# Patient Record
Sex: FEMALE | Race: WHITE | NOT HISPANIC OR LATINO | Employment: STUDENT | URBAN - METROPOLITAN AREA
[De-identification: names, ages, dates, MRNs, and addresses within clinical notes are randomized per-mention and may not be internally consistent; named-entity substitution may affect disease eponyms.]

---
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
## 2020-07-07 DIAGNOSIS — F33.9 RECURRENT MAJOR DEPRESSION (H): ICD-10-CM

## 2020-07-07 DIAGNOSIS — R53.83 FATIGUE: Primary | ICD-10-CM

## 2020-07-15 DIAGNOSIS — R53.83 FATIGUE: ICD-10-CM

## 2020-07-15 DIAGNOSIS — F33.9 RECURRENT MAJOR DEPRESSION (H): ICD-10-CM

## 2020-07-15 LAB
BASOPHILS # BLD AUTO: 0 10E9/L (ref 0–0.2)
BASOPHILS NFR BLD AUTO: 0.4 %
DIFFERENTIAL METHOD BLD: NORMAL
EOSINOPHIL # BLD AUTO: 0.1 10E9/L (ref 0–0.7)
EOSINOPHIL NFR BLD AUTO: 2.2 %
ERYTHROCYTE [DISTWIDTH] IN BLOOD BY AUTOMATED COUNT: 12 % (ref 10–15)
HCT VFR BLD AUTO: 38.1 % (ref 35–47)
HGB BLD-MCNC: 13 G/DL (ref 11.7–15.7)
LYMPHOCYTES # BLD AUTO: 2 10E9/L (ref 0.8–5.3)
LYMPHOCYTES NFR BLD AUTO: 41.1 %
MCH RBC QN AUTO: 31.6 PG (ref 26.5–33)
MCHC RBC AUTO-ENTMCNC: 34.1 G/DL (ref 31.5–36.5)
MCV RBC AUTO: 93 FL (ref 78–100)
MONOCYTES # BLD AUTO: 0.3 10E9/L (ref 0–1.3)
MONOCYTES NFR BLD AUTO: 5.6 %
NEUTROPHILS # BLD AUTO: 2.5 10E9/L (ref 1.6–8.3)
NEUTROPHILS NFR BLD AUTO: 50.7 %
PLATELET # BLD AUTO: 210 10E9/L (ref 150–450)
RBC # BLD AUTO: 4.12 10E12/L (ref 3.8–5.2)
WBC # BLD AUTO: 5 10E9/L (ref 4–11)

## 2020-07-15 PROCEDURE — 36415 COLL VENOUS BLD VENIPUNCTURE: CPT | Performed by: INTERNAL MEDICINE

## 2020-07-15 PROCEDURE — 80050 GENERAL HEALTH PANEL: CPT | Performed by: INTERNAL MEDICINE

## 2020-07-15 PROCEDURE — 82306 VITAMIN D 25 HYDROXY: CPT | Performed by: INTERNAL MEDICINE

## 2020-07-16 LAB — DEPRECATED CALCIDIOL+CALCIFEROL SERPL-MC: 37 UG/L (ref 20–75)

## 2020-07-17 LAB
ALBUMIN SERPL-MCNC: 3.6 G/DL (ref 3.4–5)
ALP SERPL-CCNC: 56 U/L (ref 40–150)
ALT SERPL W P-5'-P-CCNC: 17 U/L (ref 0–50)
ANION GAP SERPL CALCULATED.3IONS-SCNC: 7 MMOL/L (ref 3–14)
AST SERPL W P-5'-P-CCNC: 12 U/L (ref 0–45)
BILIRUB SERPL-MCNC: 0.2 MG/DL (ref 0.2–1.3)
BUN SERPL-MCNC: 6 MG/DL (ref 7–30)
CALCIUM SERPL-MCNC: 8.4 MG/DL (ref 8.5–10.1)
CHLORIDE SERPL-SCNC: 109 MMOL/L (ref 94–109)
CO2 SERPL-SCNC: 23 MMOL/L (ref 20–32)
CREAT SERPL-MCNC: 0.77 MG/DL (ref 0.52–1.04)
GFR SERPL CREATININE-BSD FRML MDRD: >90 ML/MIN/{1.73_M2}
GLUCOSE SERPL-MCNC: 113 MG/DL (ref 70–99)
POTASSIUM SERPL-SCNC: 3.8 MMOL/L (ref 3.4–5.3)
PROT SERPL-MCNC: 7 G/DL (ref 6.8–8.8)
SODIUM SERPL-SCNC: 139 MMOL/L (ref 133–144)
TSH SERPL DL<=0.005 MIU/L-ACNC: 1.14 MU/L (ref 0.4–4)

## 2021-04-22 ENCOUNTER — MEDICAL CORRESPONDENCE (OUTPATIENT)
Dept: HEALTH INFORMATION MANAGEMENT | Facility: CLINIC | Age: 21
End: 2021-04-22

## 2021-04-22 ENCOUNTER — HOSPITAL ENCOUNTER (OUTPATIENT)
Dept: CARDIOLOGY | Facility: CLINIC | Age: 21
End: 2021-04-22
Attending: PEDIATRICS
Payer: COMMERCIAL

## 2021-04-22 ENCOUNTER — HOSPITAL ENCOUNTER (OUTPATIENT)
Dept: GENERAL RADIOLOGY | Facility: CLINIC | Age: 21
End: 2021-04-22
Attending: PEDIATRICS
Payer: COMMERCIAL

## 2021-04-22 DIAGNOSIS — R00.0 TACHYCARDIA: ICD-10-CM

## 2021-04-22 DIAGNOSIS — R00.0 TACHYCARDIA: Primary | ICD-10-CM

## 2021-04-22 LAB — INTERPRETATION ECG - MUSE: NORMAL

## 2021-04-22 PROCEDURE — 93005 ELECTROCARDIOGRAM TRACING: CPT

## 2021-04-22 PROCEDURE — 93227 XTRNL ECG REC<48 HR R&I: CPT | Performed by: INTERNAL MEDICINE

## 2021-04-22 PROCEDURE — 71046 X-RAY EXAM CHEST 2 VIEWS: CPT

## 2021-04-22 PROCEDURE — 93225 XTRNL ECG REC<48 HRS REC: CPT

## 2021-05-02 ENCOUNTER — VIRTUAL VISIT (OUTPATIENT)
Dept: URGENT CARE | Facility: CLINIC | Age: 21
End: 2021-05-02
Payer: COMMERCIAL

## 2021-05-02 DIAGNOSIS — J01.10 ACUTE NON-RECURRENT FRONTAL SINUSITIS: Primary | ICD-10-CM

## 2021-05-02 DIAGNOSIS — R05.9 COUGH: ICD-10-CM

## 2021-05-02 PROCEDURE — 99203 OFFICE O/P NEW LOW 30 MIN: CPT | Mod: TEL | Performed by: NURSE PRACTITIONER

## 2021-05-02 RX ORDER — BENZONATATE 200 MG/1
200 CAPSULE ORAL 3 TIMES DAILY PRN
Qty: 21 CAPSULE | Refills: 0 | Status: SHIPPED | OUTPATIENT
Start: 2021-05-02 | End: 2021-05-09

## 2021-05-02 NOTE — PROGRESS NOTES
SUBJECTIVE:   Sara Ruiz is a 20 year old female presenting with a chief complaint of cold symptoms.  Onset of symptoms was 8 days ago.  Course of illness is worsening.    Severity moderate  Current and Associated symptoms: cough (more at night) nasal congestion, pressure in sinus, headache, sore throat from cough and PND, fatigue  No fever sob wheezing. Hydrated  Treatment measures tried include sudafed mucinex Fluids and Rest.  Predisposing factors include: dad ill. Covid negative.     No past medical history on file.  No current outpatient medications on file.     Social History     Tobacco Use     Smoking status: Not on file   Substance Use Topics     Alcohol use: Not on file       ROS:  Review of systems negative except as stated above.    OBJECTIVE:  GENERAL APPEARANCE:  alert and no distress  RESP: lungs clear   CV: regular rates and rhythm  SKIN: no suspicious lesions or rashes    ASSESSMENT:  (J01.10) Acute non-recurrent frontal sinusitis  (primary encounter diagnosis)    Plan: amoxicillin-clavulanate (AUGMENTIN) 875-125 MG tablet BID X 7 days  Tessalon prn for cough.   Fluids home treat and monitor symptoms    Telephone time spent 15 minutes    OLESYA Zarco CNP

## 2021-05-11 ENCOUNTER — VIRTUAL VISIT (OUTPATIENT)
Dept: URGENT CARE | Facility: CLINIC | Age: 21
End: 2021-05-11
Payer: COMMERCIAL

## 2021-05-11 DIAGNOSIS — R21 RASH: Primary | ICD-10-CM

## 2021-05-11 PROCEDURE — 99213 OFFICE O/P EST LOW 20 MIN: CPT | Mod: GT | Performed by: EMERGENCY MEDICINE

## 2021-05-11 RX ORDER — CLOBETASOL PROPIONATE 0.5 MG/G
CREAM TOPICAL 2 TIMES DAILY
Qty: 15 G | Refills: 1 | Status: SHIPPED | OUTPATIENT
Start: 2021-05-11 | End: 2022-10-27

## 2021-05-11 NOTE — PROGRESS NOTES
Video visit:    Start time 2:20 PM  Stop time 2:35 PM    Assessment: Localized rash on feet and ankles appears to be contact in nature.  No other body areas involvement or systemic allergic symptoms.    Plan: Wash new symptoms carefully and wash feet carefully.  Use clobetasol topically twice daily.  Take Zyrtec daily while rash persist.  Recheck if worsening rash or no improvement in 3 to 4 days.    HPI: Patient is a 20-year-old female who noted the onset of a rash on her feet and ankles today.  She did buy new sandals last week but has been wearing them.  She does state she was walking in the grass with the sandals yesterday.  The rash started today.  It is pruritic.  It is on her foot and extends up to her ankles.  No other areas of body involved.  No systemic allergic symptoms.  No other detergents or new medications.      ROS: See HPI otherwise normal.    Not on File   Current Outpatient Medications   Medication Sig Dispense Refill     clobetasol (TEMOVATE) 0.05 % external cream Apply topically 2 times daily 15 g 1         PE: No acute distress.  Alert.  On video, I can see the areas that appear to be macular papular and somewhat urticarial in location.  They are sparse in bilateral on her feet and lower ankles region.        TREATMENT: None.      ASSESSMENT: Probable contact dermatitis possibly related to chemical or grass.      DIAGNOSIS: Dermatitis.      PLAN: Washed hands and feet of possible chemical contaminant.  Use clobetasol twice daily and take Zyrtec daily while rash persist.  Recheck if worse.  Recheck 3 to 4 days if rash persists.    Time: 15 minutes

## 2021-05-29 ENCOUNTER — HEALTH MAINTENANCE LETTER (OUTPATIENT)
Age: 21
End: 2021-05-29

## 2021-09-18 ENCOUNTER — HEALTH MAINTENANCE LETTER (OUTPATIENT)
Age: 21
End: 2021-09-18

## 2021-10-18 ENCOUNTER — E-VISIT (OUTPATIENT)
Dept: FAMILY MEDICINE | Facility: CLINIC | Age: 21
End: 2021-10-18
Payer: COMMERCIAL

## 2021-10-18 DIAGNOSIS — B35.9 TINEA: Primary | ICD-10-CM

## 2021-10-18 PROCEDURE — 99421 OL DIG E/M SVC 5-10 MIN: CPT | Performed by: PHYSICIAN ASSISTANT

## 2021-10-18 RX ORDER — CLOTRIMAZOLE 1 %
CREAM (GRAM) TOPICAL 2 TIMES DAILY
Qty: 60 G | Refills: 1 | Status: SHIPPED | OUTPATIENT
Start: 2021-10-18 | End: 2022-10-27

## 2021-12-17 ENCOUNTER — TELEPHONE (OUTPATIENT)
Dept: NUTRITION | Facility: CLINIC | Age: 21
End: 2021-12-17
Payer: COMMERCIAL

## 2021-12-17 NOTE — PROGRESS NOTES
Patient was scheduled for a virtual outpatient visit with this writer today at 1100. Patient did not show up on the video call.    Nila Jones RDN, LD  Clinical Dietitian  Office (Monday-Friday): [phone]

## 2022-01-07 ENCOUNTER — VIRTUAL VISIT (OUTPATIENT)
Dept: PEDIATRICS | Facility: CLINIC | Age: 22
End: 2022-01-07
Payer: COMMERCIAL

## 2022-01-07 DIAGNOSIS — R09.81 NASAL CONGESTION: ICD-10-CM

## 2022-01-07 DIAGNOSIS — J32.9 VIRAL SINUSITIS: Primary | ICD-10-CM

## 2022-01-07 DIAGNOSIS — U07.1 INFECTION DUE TO 2019 NOVEL CORONAVIRUS: ICD-10-CM

## 2022-01-07 DIAGNOSIS — B97.89 VIRAL SINUSITIS: Primary | ICD-10-CM

## 2022-01-07 PROCEDURE — 99203 OFFICE O/P NEW LOW 30 MIN: CPT | Mod: 95 | Performed by: PEDIATRICS

## 2022-01-07 RX ORDER — LISDEXAMFETAMINE DIMESYLATE 70 MG/1
CAPSULE ORAL
COMMUNITY
Start: 2021-12-24 | End: 2023-09-22

## 2022-01-07 RX ORDER — CALCIUM CARBONATE 500(1250)
TABLET ORAL
COMMUNITY
End: 2024-02-23

## 2022-01-07 RX ORDER — SERTRALINE HYDROCHLORIDE 25 MG/1
TABLET, FILM COATED ORAL
COMMUNITY
End: 2022-12-20

## 2022-01-07 RX ORDER — MULTIVITAMIN WITH IRON
TABLET ORAL
COMMUNITY

## 2022-01-07 RX ORDER — CEFDINIR 300 MG/1
300 CAPSULE ORAL 2 TIMES DAILY
Qty: 20 CAPSULE | Refills: 0 | Status: SHIPPED | OUTPATIENT
Start: 2022-01-07 | End: 2022-01-17

## 2022-01-07 RX ORDER — DROSPIRENONE AND ETHINYL ESTRADIOL 0.02-3(28)
1 KIT ORAL
COMMUNITY
End: 2022-10-27

## 2022-01-07 NOTE — PROGRESS NOTES
Chyna is a 21 year old who is being evaluated via a billable video visit.      How would you like to obtain your AVS? MyChart  If the video visit is dropped, the invitation should be resent by: [phone]  Will anyone else be joining your video visit? No      Video Start Time: 304p    Assessment & Plan     Viral sinusitis  Infection due to 2019 novel coronavirus  Nasal congestion  Well appearing  Likely viral sinusitis given small duration and lack of fevers, likely to resolve without issue  No other concerning symptoms/signs with COVID infection currently  Reviewed when to initiate treatment for sinusitis which would be >10 days or if worsening, rx sent to where she attends college to fill if needed.   Reviewed other otc tylenol/ibuprofen and sudafed  RTC precautions discussed.   - cefdinir (OMNICEF) 300 MG capsule; Take 1 capsule (300 mg) by mouth 2 times daily for 10 days Start using if no improvement by 10 days from symptom onset    Prescription drug management             Return in about 6 months (around 7/7/2022) for Routine preventive.    Bharat Stewart MD  [facility]   Chyna is a 21 year old who presents for the following health issues     HPI   Tested positive for covid a couple days ago.  Symptoms characteristic of congestion, sinus pressure, yellow green rhinorrhea, pain in upper jaw. Headache. Sore throat and cough. Seems to be a little better but she is worried that with covid she may need antibiotics for sinus infection.   She had a sinus infection last year and seems similar  She is using otc products like sudafed.       History of anxiety/depression/adhd on medications      Review of Systems   See HPI for pertinent positives/negatives. All other systems reviewed and are negative        Objective           Vitals:  No vitals were obtained today due to virtual visit.    Physical Exam   GENERAL: Healthy, alert and no distress  EYES: Eyes grossly normal to  inspection.  No discharge or erythema, or obvious scleral/conjunctival abnormalities.  RESP: No audible wheeze, cough, or visible cyanosis.  No visible retractions or increased work of breathing.    SKIN: Visible skin clear. No significant rash, abnormal pigmentation or lesions.  NEURO: Cranial nerves grossly intact.  Mentation and speech appropriate for age.  PSYCH: Mentation appears normal, affect normal/bright, judgement and insight intact, normal speech and appearance well-groomed.                Video-Visit Details    Type of service:  Video Visit    Video End Time: 314p      Originating Location (pt. Location): Home    Distant Location (provider location):  [facility]     Platform used for Video Visit: Cerephex

## 2022-03-14 ENCOUNTER — TRANSFERRED RECORDS (OUTPATIENT)
Dept: MULTI SPECIALTY CLINIC | Facility: CLINIC | Age: 22
End: 2022-03-14

## 2022-03-14 LAB
CHLAMYDIA - HIM PATIENT REPORTED: NEGATIVE
PAP SMEAR - HIM PATIENT REPORTED: NEGATIVE

## 2022-06-25 ENCOUNTER — HEALTH MAINTENANCE LETTER (OUTPATIENT)
Age: 22
End: 2022-06-25

## 2022-10-27 ENCOUNTER — OFFICE VISIT (OUTPATIENT)
Dept: FAMILY MEDICINE | Facility: CLINIC | Age: 22
End: 2022-10-27
Payer: COMMERCIAL

## 2022-10-27 VITALS
BODY MASS INDEX: 20.51 KG/M2 | HEIGHT: 67 IN | WEIGHT: 130.7 LBS | OXYGEN SATURATION: 100 % | RESPIRATION RATE: 16 BRPM | TEMPERATURE: 99.1 F | DIASTOLIC BLOOD PRESSURE: 58 MMHG | SYSTOLIC BLOOD PRESSURE: 96 MMHG | HEART RATE: 120 BPM

## 2022-10-27 DIAGNOSIS — R04.0 EPISTAXIS: ICD-10-CM

## 2022-10-27 DIAGNOSIS — Z01.818 PREOP GENERAL PHYSICAL EXAM: Primary | ICD-10-CM

## 2022-10-27 DIAGNOSIS — F32.1 MODERATE MAJOR DEPRESSION (H): ICD-10-CM

## 2022-10-27 PROBLEM — F98.8 ATTENTION DEFICIT DISORDER OF ADULT: Status: ACTIVE | Noted: 2022-10-27

## 2022-10-27 LAB
ANION GAP SERPL CALCULATED.3IONS-SCNC: 6 MMOL/L (ref 3–14)
BASOPHILS # BLD AUTO: 0 10E3/UL (ref 0–0.2)
BASOPHILS NFR BLD AUTO: 1 %
BUN SERPL-MCNC: 11 MG/DL (ref 7–30)
CALCIUM SERPL-MCNC: 9.4 MG/DL (ref 8.5–10.1)
CHLORIDE BLD-SCNC: 109 MMOL/L (ref 94–109)
CO2 SERPL-SCNC: 26 MMOL/L (ref 20–32)
CREAT SERPL-MCNC: 0.73 MG/DL (ref 0.52–1.04)
DEPRECATED CALCIDIOL+CALCIFEROL SERPL-MC: 96 UG/L (ref 20–75)
EOSINOPHIL # BLD AUTO: 0.1 10E3/UL (ref 0–0.7)
EOSINOPHIL NFR BLD AUTO: 3 %
ERYTHROCYTE [DISTWIDTH] IN BLOOD BY AUTOMATED COUNT: 11.7 % (ref 10–15)
GFR SERPL CREATININE-BSD FRML MDRD: >90 ML/MIN/1.73M2
GLUCOSE BLD-MCNC: 88 MG/DL (ref 70–99)
HCT VFR BLD AUTO: 40.7 % (ref 35–47)
HGB BLD-MCNC: 13.7 G/DL (ref 11.7–15.7)
LYMPHOCYTES # BLD AUTO: 1.9 10E3/UL (ref 0.8–5.3)
LYMPHOCYTES NFR BLD AUTO: 48 %
MCH RBC QN AUTO: 30.1 PG (ref 26.5–33)
MCHC RBC AUTO-ENTMCNC: 33.7 G/DL (ref 31.5–36.5)
MCV RBC AUTO: 90 FL (ref 78–100)
MONOCYTES # BLD AUTO: 0.3 10E3/UL (ref 0–1.3)
MONOCYTES NFR BLD AUTO: 7 %
NEUTROPHILS # BLD AUTO: 1.7 10E3/UL (ref 1.6–8.3)
NEUTROPHILS NFR BLD AUTO: 43 %
PLATELET # BLD AUTO: 178 10E3/UL (ref 150–450)
POTASSIUM BLD-SCNC: 3.7 MMOL/L (ref 3.4–5.3)
RBC # BLD AUTO: 4.55 10E6/UL (ref 3.8–5.2)
SODIUM SERPL-SCNC: 141 MMOL/L (ref 133–144)
TSH SERPL DL<=0.005 MIU/L-ACNC: 2.08 MU/L (ref 0.4–4)
WBC # BLD AUTO: 4 10E3/UL (ref 4–11)

## 2022-10-27 PROCEDURE — 85025 COMPLETE CBC W/AUTO DIFF WBC: CPT | Performed by: PHYSICIAN ASSISTANT

## 2022-10-27 PROCEDURE — 82306 VITAMIN D 25 HYDROXY: CPT | Performed by: PHYSICIAN ASSISTANT

## 2022-10-27 PROCEDURE — 84443 ASSAY THYROID STIM HORMONE: CPT | Performed by: PHYSICIAN ASSISTANT

## 2022-10-27 PROCEDURE — 96127 BRIEF EMOTIONAL/BEHAV ASSMT: CPT | Performed by: PHYSICIAN ASSISTANT

## 2022-10-27 PROCEDURE — 99213 OFFICE O/P EST LOW 20 MIN: CPT | Performed by: PHYSICIAN ASSISTANT

## 2022-10-27 PROCEDURE — 80048 BASIC METABOLIC PNL TOTAL CA: CPT | Performed by: PHYSICIAN ASSISTANT

## 2022-10-27 PROCEDURE — 36415 COLL VENOUS BLD VENIPUNCTURE: CPT | Performed by: PHYSICIAN ASSISTANT

## 2022-10-27 RX ORDER — BUSPIRONE HYDROCHLORIDE 10 MG/1
20 TABLET ORAL 2 TIMES DAILY
COMMUNITY
End: 2023-09-22

## 2022-10-27 RX ORDER — MIRTAZAPINE 15 MG/1
15 TABLET, FILM COATED ORAL AT BEDTIME
COMMUNITY
End: 2023-01-24

## 2022-10-27 ASSESSMENT — PATIENT HEALTH QUESTIONNAIRE - PHQ9
10. IF YOU CHECKED OFF ANY PROBLEMS, HOW DIFFICULT HAVE THESE PROBLEMS MADE IT FOR YOU TO DO YOUR WORK, TAKE CARE OF THINGS AT HOME, OR GET ALONG WITH OTHER PEOPLE: VERY DIFFICULT
SUM OF ALL RESPONSES TO PHQ QUESTIONS 1-9: 16
SUM OF ALL RESPONSES TO PHQ QUESTIONS 1-9: 16

## 2022-10-27 ASSESSMENT — PAIN SCALES - GENERAL: PAINLEVEL: NO PAIN (0)

## 2022-10-27 NOTE — PATIENT INSTRUCTIONS
Preparing for Your Surgery  Getting started  A nurse will call you to review your health history and instructions. They will give you an arrival time based on your scheduled surgery time. Please be ready to share:    Your doctor's clinic name and phone number    Your medical, surgical and anesthesia history    A list of allergies and sensitivities    A list of medicines, including herbal treatments and over-the-counter drugs    Whether the patient has a legal guardian (ask how to send us the papers in advance)  Please tell us if you're pregnant--or if there's any chance you might be pregnant. Some surgeries may injure a fetus (unborn baby), so they require a pregnancy test. Surgeries that are safe for a fetus don't always need a test, and you can choose whether to have one.   If you have a child who's having surgery, please ask for a copy of Preparing for Your Child's Surgery.    Preparing for surgery    Within 10 to 30 days of surgery: Have a pre-op exam (sometimes called an H&P, or History and Physical). This can be done at a clinic or pre-operative center.  ? If you're having a , you may not need this exam. Talk to your care team.    At your pre-op exam, talk to your care team about all medicines you take. If you need to stop any medicines before surgery, ask when to start taking them again.  ? We do this for your safety. Many medicines can make you bleed too much during surgery. Some change how well surgery (anesthesia) drugs work.    Call your insurance company to let them know you're having surgery. (If you don't have insurance, call [phone].)    Call your clinic if there's any change in your health. This includes signs of a cold or flu (sore throat, runny nose, cough, rash, fever). It also includes a scrape or scratch near the surgery site.    If you have questions on the day of surgery, call your hospital or surgery center.  COVID testing  You may need to be tested for COVID-19 before having  surgery. If so, we will give you instructions (or click here).  Eating and drinking guidelines  For your safety: Unless your surgeon tells you otherwise, follow the guidelines below.    Eat and drink as usual until 8 hours before surgery. After that, no food or milk.    Drink clear liquids until 2 hours before surgery. These are liquids you can see through, like water, Gatorade and Propel Water. You may also have black coffee and tea (no cream or milk).    Nothing by mouth within 2 hours of surgery. This includes gum, candy and breath mints.    If you drink alcohol: Stop drinking it the night before surgery.    If your care team tells you to take medicine on the morning of surgery, it's okay to take it with a sip of water.  Preventing infection    Shower or bathe the night before and morning of your surgery. Follow the instructions your clinic gave you. (If no instructions, use regular soap.)    Don't shave or clip hair near your surgery site. We'll remove the hair if needed.    Don't smoke or vape the morning of surgery. You may chew nicotine gum up to 2 hours before surgery. A nicotine patch is okay.  ? Note: Some surgeries require you to completely quit smoking and nicotine. Check with your surgeon.    Your care team will make every effort to keep you safe from infection. We will:  ? Clean our hands often with soap and water (or an alcohol-based hand rub).  ? Clean the skin at your surgery site with a special soap that kills germs.  ? Give you a special gown to keep you warm. (Cold raises the risk of infection.)  ? Wear special hair covers, masks, gowns and gloves during surgery.  ? Give antibiotic medicine, if prescribed. Not all surgeries need antibiotics.  What to bring on the day of surgery    Photo ID and insurance card    Copy of your health care directive, if you have one    Glasses and hearing aides (bring cases)  ? You can't wear contacts during surgery    Inhaler and eye drops, if you use them (tell us  about these when you arrive)    CPAP machine or breathing device, if you use them    A few personal items, if spending the night    If you have . . .  ? A pacemaker, ICD (cardiac defibrillator) or other implant: Bring the ID card.  ? An implanted stimulator: Bring the remote control.  ? A legal guardian: Bring a copy of the certified (court-stamped) guardianship papers.  Please remove any jewelry, including body piercings. Leave jewelry and other valuables at home.  If you're going home the day of surgery    You must have a responsible adult drive you home. They should stay with you overnight as well.    If you don't have someone to stay with you, and you aren't safe to go home alone, we may keep you overnight. Insurance often won't pay for this.  After surgery  If it's hard to control your pain or you need more pain medicine, please call your surgeon's office.  Questions?   If you have any questions for your care team, list them here: _________________________________________________________________________________________________________________________________________________________________________ ____________________________________ ____________________________________ ____________________________________  For informational purposes only. Not to replace the advice of your health care provider. Copyright   2003, 2019 [facility]. All rights reserved. Clinically reviewed by Karol Crystal MD. [employer] [id number] - REV 07/22.

## 2022-10-27 NOTE — PROGRESS NOTES
[facility]  [address] [address] [address]  Phone: [phone]  Primary Provider: Sreekanth Garduno  Pre-op Performing Provider: CECY RAMAN      PREOPERATIVE EVALUATION:  Today's date: 10/27/2022    Sara Ruiz is a 22 year old female who presents for a preoperative evaluation.    Surgical Information:  Surgery/Procedure: electrocautery for nose  Surgery Location: [facility]  Surgeon: Dr Jenkins  Surgery Date: 11/23/2022  Time of Surgery: TBD  Where patient plans to recover: At home with family  Fax number for surgical facility: [phone]    Type of Anesthesia Anticipated: General    Assessment & Plan     The proposed surgical procedure is considered LOW risk.    Preop general physical exam  Ok for procedure  - CBC with platelets and differential; Future  - Basic metabolic panel  (Ca, Cl, CO2, Creat, Gluc, K, Na, BUN); Future  - CBC with platelets and differential  - Basic metabolic panel  (Ca, Cl, CO2, Creat, Gluc, K, Na, BUN)    Epistaxis  Following with ENT  - CBC with platelets and differential; Future  - CBC with platelets and differential    Moderate major depression (H)  Patient had lab request from Psych provider.  Has follow up physical with me soon.  - TSH with free T4 reflex; Future  - Vitamin D Deficiency; Future  - TSH with free T4 reflex  - Vitamin D Deficiency    Please abstract the following data from this visit with this patient into the appropriate field in Epic:    Tests that can be patient reported without a hard copy:    Pap smear done on this date: 3/14/22 (approximately), by this group: [facility], results were normal and Chlamydia testing done on this date: 3/14/22           Risks and Recommendations:  The patient has the following additional risks and recommendations for perioperative complications:   - No identified additional risk factors other than previously addressed    Medication Instructions:  Patient is to  take all scheduled medications on the day of surgery    RECOMMENDATION:  APPROVAL GIVEN to proceed with proposed procedure, without further diagnostic evaluation.      Subjective     HPI related to upcoming procedure: patient here for pre op exam prior to surgery for recurrent nose bleeds.  No current concerns.    Preop Questions 10/27/2022   1. Have you ever had a heart attack or stroke? No   2. Have you ever had surgery on your heart or blood vessels, such as a stent placement, a coronary artery bypass, or surgery on an artery in your head, neck, heart, or legs? No   3. Do you have chest pain with activity? No   4. Do you have a history of  heart failure? No   5. Do you currently have a cold, bronchitis or symptoms of other infection? No   6. Do you have a cough, shortness of breath, or wheezing? No   7. Do you or anyone in your family have previous history of blood clots? YES - mom had PE in pregnancy, had antiphospholipid syn   8. Do you or does anyone in your family have a serious bleeding problem such as prolonged bleeding following surgeries or cuts? No   9. Have you ever had problems with anemia or been told to take iron pills? YES    10. Have you had any abnormal blood loss such as black, tarry or bloody stools, or abnormal vaginal bleeding? UNKNOWN    11. Have you ever had a blood transfusion? No   12. Are you willing to have a blood transfusion if it is medically needed before, during, or after your surgery? Yes   13. Have you or any of your relatives ever had problems with anesthesia? No   14. Do you have sleep apnea, excessive snoring or daytime drowsiness? No   15. Do you have any artifical heart valves or other implanted medical devices like a pacemaker, defibrillator, or continuous glucose monitor? No   16. Do you have artificial joints? No   17. Are you allergic to latex? No   18. Is there any chance that you may be pregnant? No         Health Care Directive:  Patient does not have a Health Care  Directive or Living Will: Patient states has Advance Directive and will bring in a copy to clinic.    Preoperative Review of :   reviewed - controlled substances reflected in medication list.      Status of Chronic Conditions:  See problem list for active medical problems.  Problems all longstanding and stable, except as noted/documented.  See ROS for pertinent symptoms related to these conditions.      Review of Systems  Constitutional, neuro, ENT, endocrine, pulmonary, cardiac, gastrointestinal, genitourinary, musculoskeletal, integument and psychiatric systems are negative, except as otherwise noted.    There are no problems to display for this patient.     No past medical history on file.  No past surgical history on file.  Current Outpatient Medications   Medication Sig Dispense Refill     calcium carbonate (OS-JOSÉ) 500 MG tablet        cholecalciferol 50 MCG (2000 UT) CAPS Take 2,000 Units by mouth       clobetasol (TEMOVATE) 0.05 % external cream Apply topically 2 times daily 15 g 1     clotrimazole (LOTRIMIN) 1 % external cream Apply topically 2 times daily (Patient not taking: Reported on 1/7/2022) 60 g 1     drospirenone-ethinyl estradiol (PATRICK) 3-0.02 MG tablet Take 1 tablet by mouth       magnesium 250 MG tablet        sertraline (ZOLOFT) 25 MG tablet        VYVANSE 70 MG capsule          No Known Allergies     Social History     Tobacco Use     Smoking status: Not on file     Smokeless tobacco: Not on file   Substance Use Topics     Alcohol use: Not on file        History   Drug Use Not on file     Answers for HPI/ROS submitted by the patient on 10/27/2022  If you checked off any problems, how difficult have these problems made it for you to do your work, take care of things at home, or get along with other people?: Very difficult  PHQ9 TOTAL SCORE: 16      Objective     There were no vitals taken for this visit.    Physical Exam    GENERAL APPEARANCE: healthy, alert and no distress     EYES: EOMI,  PERRL     HENT: ear canals and TM's normal and nose and mouth without ulcers or lesions     NECK: no adenopathy, no asymmetry, masses, or scars and thyroid normal to palpation     RESP: lungs clear to auscultation - no rales, rhonchi or wheezes     CV: regular rates and rhythm, normal S1 S2, no S3 or S4 and no murmur, click or rub     ABDOMEN:  soft, nontender, no HSM or masses and bowel sounds normal     MS: extremities normal- no gross deformities noted, no evidence of inflammation in joints, FROM in all extremities.     SKIN: no suspicious lesions or rashes     NEURO: Normal strength and tone, sensory exam grossly normal, mentation intact and speech normal     PSYCH: mentation appears normal. and affect normal/bright     LYMPHATICS: No cervical adenopathy    No results for input(s): HGB, PLT, INR, NA, POTASSIUM, CR, A1C in the last [id number] hours.     Diagnostics:  Labs pending at this time.  Results will be reviewed when available.   No EKG required, no history of coronary heart disease, significant arrhythmia, peripheral arterial disease or other structural heart disease.    Revised Cardiac Risk Index (RCRI):  The patient has the following serious cardiovascular risks for perioperative complications:   - No serious cardiac risks = 0 points     RCRI Interpretation: 0 points: Class I (very low risk - 0.4% complication rate)           Signed Electronically by: Bhupinder Simmons PA-C  Copy of this evaluation report is provided to requesting physician.

## 2022-11-19 ENCOUNTER — HEALTH MAINTENANCE LETTER (OUTPATIENT)
Age: 22
End: 2022-11-19

## 2022-11-25 ENCOUNTER — OFFICE VISIT (OUTPATIENT)
Dept: URGENT CARE | Facility: URGENT CARE | Age: 22
End: 2022-11-25
Payer: COMMERCIAL

## 2022-11-25 VITALS
RESPIRATION RATE: 14 BRPM | DIASTOLIC BLOOD PRESSURE: 72 MMHG | OXYGEN SATURATION: 98 % | BODY MASS INDEX: 20.36 KG/M2 | TEMPERATURE: 98.9 F | WEIGHT: 130 LBS | SYSTOLIC BLOOD PRESSURE: 118 MMHG | HEART RATE: 89 BPM

## 2022-11-25 DIAGNOSIS — Z20.828 EXPOSURE TO INFLUENZA: Primary | ICD-10-CM

## 2022-11-25 LAB
FLUAV AG SPEC QL IA: NEGATIVE
FLUBV AG SPEC QL IA: NEGATIVE

## 2022-11-25 PROCEDURE — 99213 OFFICE O/P EST LOW 20 MIN: CPT | Performed by: PHYSICIAN ASSISTANT

## 2022-11-25 PROCEDURE — 87804 INFLUENZA ASSAY W/OPTIC: CPT | Performed by: PHYSICIAN ASSISTANT

## 2022-11-25 RX ORDER — OSELTAMIVIR PHOSPHATE 75 MG/1
75 CAPSULE ORAL 2 TIMES DAILY
Qty: 10 CAPSULE | Refills: 0 | Status: SHIPPED | OUTPATIENT
Start: 2022-11-25 | End: 2022-11-30

## 2022-11-26 NOTE — PROGRESS NOTES
Assessment & Plan     1. Exposure to influenza  Patient has had exposure to influenza 2 days ago, currently asymptomatic.  Influenza test is negative.  Discussed with patient both treatment and prophylaxis with Tamiflu.  At this point she is not at high risk of complications from influenza, and with side effect profile of Tamiflu (nausea/vomiting), I think it would be better to wait to see if she develops symptoms of influenza.  She was given a paper prescription of Tamiflu, and advised to begin if the development of body aches, fever, chills or dry cough.  - Influenza A/B antigen  - oseltamivir (TAMIFLU) 75 MG capsule; Take 1 capsule (75 mg) by mouth 2 times daily for 5 days  Dispense: 10 capsule; Refill: 0      Kelly Mello PA-C  [facility]    CHIEF COMPLAINT:   Chief Complaint   Patient presents with     Consult     Wants a flu test -- DAD has positive flu and wants to be tested --       Senthil Clemente is a 22 year old female who presents to clinic today for evaluation.  Patient had surgery on 1123, saw her dad in the preop area.  Today dad felt ill, and was diagnosed with influenza.  Patient had nasal cauterization, is unable to blow her nose for 2 weeks.  Concerned that if she develops flu may be measurable.  Currently asymptomatic, no fever, chills, body aches, runny nose or cough.      History reviewed. No pertinent past medical history.  History reviewed. No pertinent surgical history.  Social History     Tobacco Use     Smoking status: Never     Smokeless tobacco: Never   Substance Use Topics     Alcohol use: Not Currently     Current Outpatient Medications   Medication     busPIRone (BUSPAR) 10 MG tablet     calcium carbonate (OS-JOSÉ) 500 MG tablet     cholecalciferol 50 MCG (2000 UT) CAPS     etonogestrel (NEXPLANON) 68 MG IMPL     magnesium 250 MG tablet     mirtazapine (REMERON) 15 MG tablet     oseltamivir (TAMIFLU) 75 MG capsule     sertraline (ZOLOFT) 25 MG tablet      VYVANSE 70 MG capsule     No current facility-administered medications for this visit.     No Known Allergies    10 point ROS of systems were all negative except for pertinent positives noted in my HPI.      Exam:   /72 (BP Location: Right arm, Patient Position: Chair, Cuff Size: Adult Regular)   Pulse 89   Temp 98.9  F (37.2  C) (Oral)   Resp 14   Wt 59 kg (130 lb)   LMP 11/13/2022 (Approximate)   SpO2 98%   BMI 20.36 kg/m    Constitutional: healthy, alert and no distress  Head: Normocephalic, atraumatic.  Skin: no rashes  Neurologic: Speech clear, gait normal. Moves all extremities.    Results for orders placed or performed in visit on 11/25/22   Influenza A/B antigen     Status: Normal    Specimen: Nose; Swab   Result Value Ref Range    Influenza A antigen Negative Negative    Influenza B antigen Negative Negative    Narrative    Test results must be correlated with clinical data. If necessary, results should be confirmed by a molecular assay or viral culture.

## 2022-12-20 ENCOUNTER — OFFICE VISIT (OUTPATIENT)
Dept: FAMILY MEDICINE | Facility: CLINIC | Age: 22
End: 2022-12-20
Payer: COMMERCIAL

## 2022-12-20 VITALS
HEART RATE: 138 BPM | RESPIRATION RATE: 16 BRPM | OXYGEN SATURATION: 97 % | SYSTOLIC BLOOD PRESSURE: 100 MMHG | TEMPERATURE: 97.2 F | DIASTOLIC BLOOD PRESSURE: 62 MMHG | BODY MASS INDEX: 20.67 KG/M2 | HEIGHT: 67 IN | WEIGHT: 131.7 LBS

## 2022-12-20 DIAGNOSIS — R00.0 TACHYCARDIA: ICD-10-CM

## 2022-12-20 DIAGNOSIS — Z00.00 ROUTINE GENERAL MEDICAL EXAMINATION AT A HEALTH CARE FACILITY: Primary | ICD-10-CM

## 2022-12-20 DIAGNOSIS — F32.1 MODERATE MAJOR DEPRESSION (H): ICD-10-CM

## 2022-12-20 DIAGNOSIS — R19.7 DIARRHEA, UNSPECIFIED TYPE: ICD-10-CM

## 2022-12-20 PROCEDURE — 99395 PREV VISIT EST AGE 18-39: CPT | Performed by: PHYSICIAN ASSISTANT

## 2022-12-20 RX ORDER — DICYCLOMINE HCL 20 MG
20 TABLET ORAL 4 TIMES DAILY PRN
Qty: 40 TABLET | Refills: 0 | Status: SHIPPED | OUTPATIENT
Start: 2022-12-20

## 2022-12-20 RX ORDER — VILAZODONE HYDROCHLORIDE 10 MG/1
10 TABLET ORAL DAILY
COMMUNITY
Start: 2022-12-20 | End: 2023-09-22

## 2022-12-20 RX ORDER — NORETHINDRONE ACETATE AND ETHINYL ESTRADIOL AND FERROUS FUMARATE 1MG-20(21)
1 KIT ORAL DAILY
COMMUNITY
Start: 2022-11-22

## 2022-12-20 ASSESSMENT — ENCOUNTER SYMPTOMS
DIZZINESS: 0
JOINT SWELLING: 0
CHILLS: 0
BREAST MASS: 0
FREQUENCY: 0
DIARRHEA: 1
HEADACHES: 1
COUGH: 1
CONSTIPATION: 1
HEARTBURN: 0
SORE THROAT: 0
NERVOUS/ANXIOUS: 1
NAUSEA: 1
HEMATOCHEZIA: 0
MYALGIAS: 0
PARESTHESIAS: 0
ABDOMINAL PAIN: 0
DYSURIA: 0
HEMATURIA: 0
ARTHRALGIAS: 0
EYE PAIN: 1
SHORTNESS OF BREATH: 0
WEAKNESS: 0
PALPITATIONS: 1
FEVER: 0

## 2022-12-20 ASSESSMENT — PAIN SCALES - GENERAL: PAINLEVEL: NO PAIN (0)

## 2022-12-20 ASSESSMENT — PATIENT HEALTH QUESTIONNAIRE - PHQ9
SUM OF ALL RESPONSES TO PHQ QUESTIONS 1-9: 18
10. IF YOU CHECKED OFF ANY PROBLEMS, HOW DIFFICULT HAVE THESE PROBLEMS MADE IT FOR YOU TO DO YOUR WORK, TAKE CARE OF THINGS AT HOME, OR GET ALONG WITH OTHER PEOPLE: VERY DIFFICULT
SUM OF ALL RESPONSES TO PHQ QUESTIONS 1-9: 18

## 2022-12-20 NOTE — PROGRESS NOTES
SUBJECTIVE:   CC: Chyna is an 22 year old who presents for preventive health visit.         Patient has been advised of split billing requirements and indicates understanding: Yes       Healthy Habits:     Getting at least 3 servings of Calcium per day:  Yes    Bi-annual eye exam:  Yes    Dental care twice a year:  Yes    Sleep apnea or symptoms of sleep apnea:  Daytime drowsiness    Diet:  Gluten-free/reduced, Breakfast skipped and Other    Frequency of exercise:  None    Taking medications regularly:  Yes    Medication side effects:  Other    PHQ-2 Total Score: 4    Additional concerns today:  Yes       Does med management through [facility]  [facility] at [facility]- gets birth control from them    Most foods lately causing GI upset and diarrhea.  Going on last couple years but getting worse  Cheese. Lactose?  Gluten/bread bothers stomach  BBQ sauce      Heart rate always high.  Has had cardiology work up and Holter Monitor was done in the past which didn't show anything.    Today's PHQ-2 Score:   PHQ-2 ( 1999 [employer]) 12/20/2022   Q1: Little interest or pleasure in doing things 2   Q2: Feeling down, depressed or hopeless 2   PHQ-2 Score 4   Q1: Little interest or pleasure in doing things More than half the days   Q2: Feeling down, depressed or hopeless More than half the days   PHQ-2 Score 4       Have you ever done Advance Care Planning? (For example, a Health Directive, POLST, or a discussion with a medical provider or your loved ones about your wishes): Yes, patient states has an Advance Care Planning document and will bring a copy to the clinic.    Social History     Tobacco Use     Smoking status: Never     Passive exposure: Never     Smokeless tobacco: Never   Substance Use Topics     Alcohol use: Not Currently         Alcohol Use 12/20/2022   Prescreen: >3 drinks/day or >7 drinks/week? Not Applicable       Reviewed orders with patient.  Reviewed health maintenance and updated orders accordingly - Yes  Lab work is in  "process    Breast Cancer Screening:        History of abnormal Pap smear: NO - age 21-29 PAP every 3 years recommended     Reviewed and updated as needed this visit by clinical staff   Tobacco  Allergies  Meds              Reviewed and updated as needed this visit by Provider                     Review of Systems   Constitutional: Negative for chills and fever.   HENT: Positive for congestion. Negative for ear pain, hearing loss and sore throat.    Eyes: Positive for pain. Negative for visual disturbance.   Respiratory: Positive for cough. Negative for shortness of breath.    Cardiovascular: Positive for palpitations. Negative for chest pain and peripheral edema.   Gastrointestinal: Positive for constipation, diarrhea and nausea. Negative for abdominal pain, heartburn and hematochezia.   Breasts:  Negative for tenderness, breast mass and discharge.   Genitourinary: Positive for vaginal bleeding and vaginal discharge. Negative for dysuria, frequency, genital sores, hematuria, pelvic pain and urgency.   Musculoskeletal: Negative for arthralgias, joint swelling and myalgias.   Skin: Positive for rash.   Neurological: Positive for headaches. Negative for dizziness, weakness and paresthesias.   Psychiatric/Behavioral: Positive for mood changes. The patient is nervous/anxious.            OBJECTIVE:   /62 (BP Location: Right arm, Patient Position: Sitting, Cuff Size: Adult Regular)   Pulse (!) 138   Temp 97.2  F (36.2  C) (Oral)   Resp 16   Ht 1.702 m (5' 7\")   Wt 59.7 kg (131 lb 11.2 oz)   LMP 12/20/2022   SpO2 97%   BMI 20.63 kg/m    Physical Exam  GENERAL: healthy, alert and no distress  EYES: Eyes grossly normal to inspection, PERRL and conjunctivae and sclerae normal  HENT: ear canals and TM's normal, nose and mouth without ulcers or lesions  NECK: no adenopathy, no asymmetry, masses, or scars and thyroid normal to palpation  RESP: lungs clear to auscultation - no rales, rhonchi or wheezes  CV: " tachycardia, normal S1 S2, no S3 or S4, no murmur, click or rub, peripheral pulses strong and no peripheral edema  ABDOMEN: soft, nontender, no hepatosplenomegaly, no masses and bowel sounds normal  MS: no gross musculoskeletal defects noted, no edema  SKIN: no suspicious lesions or rashes  NEURO: Normal strength and tone, mentation intact and speech normal  PSYCH: mentation appears normal, affect normal/bright    Diagnostic Test Results:  Labs reviewed in Epic    ASSESSMENT/PLAN:   (Z00.00) Routine general medical examination at a health care facility  (primary encounter diagnosis)  Comment:   Plan: patient had labs done in October    (F32.1) Moderate major depression (H)  Comment:   Plan: Follows with Psych    PHQ 10/27/2022 12/20/2022   PHQ-9 Total Score 16 18   Q9: Thoughts of better off dead/self-harm past 2 weeks Not at all Not at all       (R00.0) Tachycardia  Comment:   Plan: discussed- if having symptoms let me know.  Can refer again if needed.    (R19.7) Diarrhea, unspecified type  Comment:   Plan: dicyclomine (BENTYL) 20 MG tablet        Discussed trial of FODMAP diet.              COUNSELING:  Reviewed preventive health counseling, as reflected in patient instructions        She reports that she has never smoked. She has never been exposed to tobacco smoke. She has never used smokeless tobacco.      Bhupinder Simmons PA-C  [facility] [place]  Answers for HPI/ROS submitted by the patient on 12/20/2022  If you checked off any problems, how difficult have these problems made it for you to do your work, take care of things at home, or get along with other people?: Very difficult  PHQ9 TOTAL SCORE: 18

## 2022-12-20 NOTE — PATIENT INSTRUCTIONS
Preventive Health Recommendations  Female Ages 21 to 25     Yearly exam:   See your health care provider every year in order to  Review health changes.   Discuss preventive care.    Review your medicines if your doctor has prescribed any.    You should be tested each year for STDs (sexually transmitted diseases).     Talk to your provider about how often you should have cholesterol testing.    Get a Pap test every three years. If you have an abnormal result, your doctor may have you test more often.    If you are at risk for diabetes, you should have a diabetes test (fasting glucose).     Shots:   Get a flu shot each year.   Get a tetanus shot every 10 years.   Consider getting the shot (vaccine) that prevents cervical cancer (Gardasil).    Nutrition:   Eat at least 5 servings of fruits and vegetables each day.  Eat whole-grain bread, whole-wheat pasta and brown rice instead of white grains and rice.  Get adequate Calcium and Vitamin D.     Lifestyle  Exercise at least 150 minutes a week each week (30 minutes a day, 5 days a week). This will help you control your weight and prevent disease.  Limit alcohol to one drink per day.  No smoking.   Wear sunscreen to prevent skin cancer.  See your dentist every six months for an exam and cleaning.

## 2022-12-22 ENCOUNTER — E-VISIT (OUTPATIENT)
Dept: FAMILY MEDICINE | Facility: CLINIC | Age: 22
End: 2022-12-22
Payer: COMMERCIAL

## 2022-12-22 DIAGNOSIS — L24.9 IRRITANT CONTACT DERMATITIS OF EXTERNAL EAR: Primary | ICD-10-CM

## 2022-12-22 PROCEDURE — 99421 OL DIG E/M SVC 5-10 MIN: CPT | Performed by: NURSE PRACTITIONER

## 2023-01-21 ENCOUNTER — E-VISIT (OUTPATIENT)
Dept: FAMILY MEDICINE | Facility: CLINIC | Age: 23
End: 2023-01-21
Payer: COMMERCIAL

## 2023-01-21 DIAGNOSIS — R55 SYNCOPE, UNSPECIFIED SYNCOPE TYPE: Primary | ICD-10-CM

## 2023-01-21 PROCEDURE — 99207 PR NON-BILLABLE SERV PER CHARTING: CPT | Performed by: PHYSICIAN ASSISTANT

## 2023-01-22 NOTE — TELEPHONE ENCOUNTER
Provider E-Visit time total (minutes):       This should be an appointment.  I can do virtual to talk with her and decide if she should see someone in [place] sooner or if can wait until she is in town.    Please help set up video appointment.      Bhupinder

## 2023-01-24 ENCOUNTER — VIRTUAL VISIT (OUTPATIENT)
Dept: FAMILY MEDICINE | Facility: CLINIC | Age: 23
End: 2023-01-24
Payer: COMMERCIAL

## 2023-01-24 DIAGNOSIS — F32.1 MODERATE MAJOR DEPRESSION (H): ICD-10-CM

## 2023-01-24 DIAGNOSIS — R55 SYNCOPE, UNSPECIFIED SYNCOPE TYPE: ICD-10-CM

## 2023-01-24 DIAGNOSIS — R00.0 TACHYCARDIA: Primary | ICD-10-CM

## 2023-01-24 PROCEDURE — 96127 BRIEF EMOTIONAL/BEHAV ASSMT: CPT | Mod: GT | Performed by: PHYSICIAN ASSISTANT

## 2023-01-24 PROCEDURE — 99213 OFFICE O/P EST LOW 20 MIN: CPT | Mod: GT | Performed by: PHYSICIAN ASSISTANT

## 2023-01-24 RX ORDER — BUSPIRONE HYDROCHLORIDE 30 MG/1
1 TABLET ORAL
COMMUNITY
Start: 2022-09-06 | End: 2023-09-22

## 2023-01-24 RX ORDER — DROSPIRENONE AND ETHINYL ESTRADIOL 0.03MG-3MG
1 KIT ORAL
COMMUNITY
Start: 2022-02-14 | End: 2023-09-22

## 2023-01-24 RX ORDER — TRIAMCINOLONE ACETONIDE 1 MG/G
CREAM TOPICAL
COMMUNITY
Start: 2022-05-05 | End: 2023-06-12

## 2023-01-24 RX ORDER — MIRTAZAPINE 15 MG/1
15 TABLET, FILM COATED ORAL
COMMUNITY
Start: 2022-09-23

## 2023-01-24 ASSESSMENT — PATIENT HEALTH QUESTIONNAIRE - PHQ9
10. IF YOU CHECKED OFF ANY PROBLEMS, HOW DIFFICULT HAVE THESE PROBLEMS MADE IT FOR YOU TO DO YOUR WORK, TAKE CARE OF THINGS AT HOME, OR GET ALONG WITH OTHER PEOPLE: VERY DIFFICULT
SUM OF ALL RESPONSES TO PHQ QUESTIONS 1-9: 14
SUM OF ALL RESPONSES TO PHQ QUESTIONS 1-9: 14

## 2023-01-24 NOTE — PROGRESS NOTES
Chyna is a 22 year old who is being evaluated via a billable video visit.      How would you like to obtain your AVS? MyChart  If the video visit is dropped, the invitation should be resent by: Text to cell phone: [phone]  Will anyone else be joining your video visit? No        Assessment & Plan     Tachycardia  Will order Zio patch for her to complete during spring break in March.  If symptoms worsen prior to that she can also try to see someone in [place] for this.  - Adult Leadless EKG Monitor 3 to 7 Days; Future    Syncope, unspecified syncope type    - Adult Leadless EKG Monitor 3 to 7 Days; Future    Moderate major depression (H)  Continue meds and treatment per Psych       MED REC REQUIRED  Post Medication Reconciliation Status: discharge medications reconciled, continue medications without change       Return in about 6 weeks (around 3/7/2023) for cardiology.    Bhupinder Simmons PA-C  [facility]    Senthil Clemente is a 22 year old, presenting for the following health issues:  ER F/U      History of Present Illness       Vascular Disease:  She presents for follow up of vascular disease.  She never takes nitroglycerin. She is not taking daily aspirin.    She eats 2-3 servings of fruits and vegetables daily.She consumes 0 sweetened beverage(s) daily.She exercises with enough effort to increase her heart rate 30 to 60 minutes per day.  She exercises with enough effort to increase her heart rate 3 or less days per week. She is missing 1 dose(s) of medications per week.  She is not taking prescribed medications regularly due to other.    Today's PHQ-9         PHQ-9 Total Score: 14    PHQ-9 Q9 Thoughts of better off dead/self-harm past 2 weeks :   Not at all    How difficult have these problems made it for you to do your work, take care of things at home, or get along with other people: Very difficult       ED/UC Followup:    Facility:  [facility]  Date of visit:  1/20/2023  Reason for visit: Fainted , cool & clammy, elevated BP.   Current Status: Doing fine. Last couple of days have had fluctuation in HR. With minimal activity it increases. With walking it goes up and takes awhile to go back down. Notices that her heart rate will be high and then drop to between 60-70's. Have been above 130's at rest for the past couple days. Highest pulse since 1/20 has been 169 bpm, lowest has been 44 bpm.      Did labs and EKG in the ED in [facility]  Has had elevated HR for a couple years  Vyvanse doesn't seem to be a trigger- has an NP who manages meds for her  The AM of the ED visit her Vybriid dose was increased.  Is not currently this right.  Has done a 24h Holter    Spring break is week of march 12th and will be home for Easter April 7    Syncope recent epidsodes and several years ago when younger.    For a recent pre op we checked CBC, TSH that were normal.    Jan 2022 did have covid    Samples of recent heart rate readings from [employer] [facility]          One reading as low as 44, this was last night overnight.          Review of Systems   Constitutional, HEENT, cardiovascular, pulmonary, gi and gu systems are negative, except as otherwise noted.      Objective           Vitals:  No vitals were obtained today due to virtual visit.    Physical Exam   GENERAL: Healthy, alert and no distress  EYES: Eyes grossly normal to inspection.  No discharge or erythema, or obvious scleral/conjunctival abnormalities.  RESP: No audible wheeze, cough, or visible cyanosis.  No visible retractions or increased work of breathing.    SKIN: Visible skin clear. No significant rash, abnormal pigmentation or lesions.  NEURO: Cranial nerves grossly intact.  Mentation and speech appropriate for age.  PSYCH: Mentation appears normal, affect normal/bright, judgement and insight intact, normal speech and appearance well-groomed.                 Video-Visit Details    Type of service:   Video Visit     Originating Location (pt. Location): Home  Distant Location (provider location):  On-site  Platform used for Video Visit: Soha

## 2023-02-28 ENCOUNTER — E-VISIT (OUTPATIENT)
Dept: URGENT CARE | Facility: CLINIC | Age: 23
End: 2023-02-28
Payer: COMMERCIAL

## 2023-02-28 DIAGNOSIS — N39.0 ACUTE UTI (URINARY TRACT INFECTION): Primary | ICD-10-CM

## 2023-02-28 PROCEDURE — 99421 OL DIG E/M SVC 5-10 MIN: CPT | Performed by: PHYSICIAN ASSISTANT

## 2023-02-28 RX ORDER — NITROFURANTOIN 25; 75 MG/1; MG/1
100 CAPSULE ORAL 2 TIMES DAILY
Qty: 10 CAPSULE | Refills: 0 | Status: SHIPPED | OUTPATIENT
Start: 2023-02-28 | End: 2023-03-05

## 2023-03-01 NOTE — PATIENT INSTRUCTIONS
Dear Sara Ruiz    After reviewing your responses, I've been able to diagnose you with a urinary tract infection, which is a common infection of the bladder with bacteria.  This is not a sexually transmitted infection, though urinating immediately after intercourse can help prevent infections.  Drinking lots of fluids is also helpful to clear your current infection and prevent the next one.      I have sent a prescription for antibiotics to your pharmacy to treat this infection.    It is important that you take all of your prescribed medication even if your symptoms are improving after a few doses.  Taking all of your medicine helps prevent the symptoms from returning.     If your symptoms worsen, you develop pain in your back or stomach, develop fevers, or are not improving in 5 days, please contact your primary care provider for an appointment or visit any of our convenient Walk-in or Urgent Care Centers to be seen, which can be found on our website here.    Thanks again for choosing us as your health care partner,    Brian zOuna PA-C

## 2023-03-11 ENCOUNTER — HOSPITAL ENCOUNTER (OUTPATIENT)
Dept: ULTRASOUND IMAGING | Facility: CLINIC | Age: 23
Discharge: HOME | End: 2023-03-11
Attending: FAMILY MEDICINE | Admitting: FAMILY MEDICINE
Payer: COMMERCIAL

## 2023-03-11 ENCOUNTER — OFFICE VISIT (OUTPATIENT)
Dept: FAMILY MEDICINE | Facility: CLINIC | Age: 23
End: 2023-03-11
Payer: COMMERCIAL

## 2023-03-11 VITALS
OXYGEN SATURATION: 100 % | TEMPERATURE: 98.9 F | HEART RATE: 86 BPM | SYSTOLIC BLOOD PRESSURE: 107 MMHG | DIASTOLIC BLOOD PRESSURE: 68 MMHG | RESPIRATION RATE: 16 BRPM

## 2023-03-11 DIAGNOSIS — L70.9 ACNE, UNSPECIFIED ACNE TYPE: Primary | ICD-10-CM

## 2023-03-11 DIAGNOSIS — R10.2 PELVIC PAIN: ICD-10-CM

## 2023-03-11 LAB
ALBUMIN UR-MCNC: NEGATIVE MG/DL
APPEARANCE UR: CLEAR
BILIRUB UR QL STRIP: NEGATIVE
COLOR UR AUTO: YELLOW
GLUCOSE UR STRIP-MCNC: NEGATIVE MG/DL
HGB UR QL STRIP: NEGATIVE
KETONES UR STRIP-MCNC: NEGATIVE MG/DL
LEUKOCYTE ESTERASE UR QL STRIP: NEGATIVE
NITRATE UR QL: NEGATIVE
PH UR STRIP: 5.5 [PH] (ref 5–8)
SP GR UR STRIP: 1.02 (ref 1–1.03)
UROBILINOGEN UR STRIP-ACNC: 0.2 E.U./DL

## 2023-03-11 PROCEDURE — 81003 URINALYSIS AUTO W/O SCOPE: CPT | Performed by: FAMILY MEDICINE

## 2023-03-11 PROCEDURE — 76830 TRANSVAGINAL US NON-OB: CPT

## 2023-03-11 PROCEDURE — 99214 OFFICE O/P EST MOD 30 MIN: CPT | Performed by: FAMILY MEDICINE

## 2023-03-11 RX ORDER — DESVENLAFAXINE 25 MG/1
1 TABLET, EXTENDED RELEASE ORAL
COMMUNITY
Start: 2023-03-05 | End: 2023-11-09

## 2023-03-11 RX ORDER — SPIRONOLACTONE 50 MG/1
50 TABLET, FILM COATED ORAL DAILY
Qty: 90 TABLET | Refills: 0 | Status: SHIPPED | OUTPATIENT
Start: 2023-03-11 | End: 2023-09-22

## 2023-03-11 NOTE — PROGRESS NOTES
Assessment & Plan       Acne, unspecified acne type  Given she has developed lesions on her upper back area and has had worsening of her symptoms I will recommend that she continue with the salicyclic acid washes we also discussed using a topical cream or antibiotic but given the extent and locations of her acne she would like to proceed with spironolactone.  3-month supply was given I discussed that she could arrange to do an E-visit with her primary care provider at the [facility] to obtain a refill.  Baseline photos included as below.  - spironolactone (ALDACTONE) 50 MG tablet  Dispense: 90 tablet; Refill: 0    Pelvic pain  - US Pelvic Complete with Transvaginal     Pelvic ultrasound unremarkable at this present time.  I for the suspicion for a surgical abdomen such as appendicitis is low.  Exam and history does not suggest cholecystitis.  Recommending close follow-up if symptoms do not improve over the next few days.      Twan Fontanez MD   [place] [facility]    Senthil Clemente is a 22 year old female who presents to clinic today for the following health issues:  Chief Complaint   Patient presents with     Urinary Problem     Pain or pressure on right side and feels tired and cold, feels like peeing but not going      HPI    E-visit on 2/28/23 for UTI symptoms have not improved since then.  She has lingering dull ache in her right lower quadrant.  No prior history of abdominal surgeries.  She has not had any fevers.  No changes to her diet.  No vomiting or diarrhea.  She has a history of suspected lactose intolerance.    Her periods are irregular although not heavy she is done a combination of Nexplanon and oral birth control to help manage symptoms.  She is not sexually active.  Denies any heavy menstrual bleeding at this time.  NO known hx of ovarian cysts.     Feeling of urgency      Acne has been an issue in the past and has had outbreaks recently and possibly due to stress she has never  been on oral medications to treat her acne but has tried topical remedies over-the-counter such as salicylic acid with minimal relief.  She does have issue with acne on her upper back area to -she is overall affecting her self-confidence to a degree. She is open to trying new therapies          Patient Active Problem List    Diagnosis Date Noted     Tachycardia 12/20/2022     Priority: Medium     Diarrhea, unspecified type 12/20/2022     Priority: Medium     Attention deficit disorder of adult 10/27/2022     Priority: Medium     Moderate major depression (H) 01/01/2015     Priority: Medium     Anxiety 01/01/2012     Priority: Medium       Current Outpatient Medications   Medication     busPIRone (BUSPAR) 10 MG tablet     calcium carbonate (OS-JOSÉ) 500 MG tablet     desvenlafaxine succinate (PRISTIQ) 25 MG 24 hr tablet     dicyclomine (BENTYL) 20 MG tablet     JUNEL FE 1/20 1-20 MG-MCG tablet     magnesium 250 MG tablet     mirtazapine (REMERON) 15 MG tablet     spironolactone (ALDACTONE) 50 MG tablet     triamcinolone (KENALOG) 0.1 % external cream     VYVANSE 70 MG capsule     busPIRone HCl (BUSPAR) 30 MG tablet     drospirenone-ethinyl estradiol (LJ) 3-0.03 MG tablet     sertraline (ZOLOFT) 50 MG tablet     vilazodone (VIIBRYD) 10 MG TABS tablet     No current facility-administered medications for this visit.           Objective    /68   Pulse 86   Temp 98.9  F (37.2  C) (Oral)   Resp 16   SpO2 100%   Physical Exam     Abd: no guarding, non-obese, soft, murphys negative  GEN: NAD, good insight, pleasant  Skin: upper back and face absent of deep scars but multiple present acne lesions which are not pustular              Results for orders placed or performed in visit on 03/11/23   UA macro with reflex to Microscopic and Culture - Clinc Collect     Status: Normal    Specimen: Urine, Clean Catch   Result Value Ref Range    Color Urine Yellow Colorless, Straw, Light Yellow, Yellow    Appearance Urine  Clear Clear    Glucose Urine Negative Negative mg/dL    Bilirubin Urine Negative Negative    Ketones Urine Negative Negative mg/dL    Specific Gravity Urine 1.025 1.005 - 1.030    Blood Urine Negative Negative    pH Urine 5.5 5.0 - 8.0    Protein Albumin Urine Negative Negative mg/dL    Urobilinogen Urine 0.2 0.2, 1.0 E.U./dL    Nitrite Urine Negative Negative    Leukocyte Esterase Urine Negative Negative    Narrative    Microscopic not indicated                     The use of Dragon/[employer] dictation services may have been used to construct the content in this note; any grammatical or spelling errors are non-intentional. Please contact the author of this note directly if you are in need of any clarification.

## 2023-03-11 NOTE — PATIENT INSTRUCTIONS
Start with breaking the spironolactone in half to make 25mg, if you are tolerating the medication after 1 week without any issues move up to the full 50mg daily.       Drink at least 60 ounces of water a day to keep hydrated each day      [phone] to schedule the pelvic ultrasound

## 2023-03-17 ENCOUNTER — HOSPITAL ENCOUNTER (OUTPATIENT)
Dept: CARDIOLOGY | Facility: CLINIC | Age: 23
Discharge: HOME | End: 2023-03-17
Attending: PHYSICIAN ASSISTANT | Admitting: PHYSICIAN ASSISTANT
Payer: COMMERCIAL

## 2023-03-17 DIAGNOSIS — R55 SYNCOPE, UNSPECIFIED SYNCOPE TYPE: ICD-10-CM

## 2023-03-17 DIAGNOSIS — R00.0 TACHYCARDIA: ICD-10-CM

## 2023-03-17 PROCEDURE — 93242 EXT ECG>48HR<7D RECORDING: CPT

## 2023-03-17 PROCEDURE — 93244 EXT ECG>48HR<7D REV&INTERPJ: CPT | Performed by: INTERNAL MEDICINE

## 2023-04-12 ENCOUNTER — TELEPHONE (OUTPATIENT)
Dept: FAMILY MEDICINE | Facility: CLINIC | Age: 23
End: 2023-04-12
Payer: COMMERCIAL

## 2023-04-12 NOTE — TELEPHONE ENCOUNTER
Pt called she continues to have the dizzy episodes, it is associated with nausea and feels like she is going to pass out  Feels like she can't hear as well  Sometimes she feels this when she stands up too fast  Pt drinks is trying to do better drinking more water  Pt is also prone to headaches    Pt is in [place]- will return in May or June    Pt is wanting to know what the next steps are    Please advise    Thank you  Dani Tang RN on 4/12/2023 at 11:38 AM

## 2023-04-12 NOTE — TELEPHONE ENCOUNTER
If it is acute and/or severe she should find someone in [place] to see her in person for exam and maybe labs etc.    She can see me this summer also if needed.      Bhupinder

## 2023-04-13 NOTE — TELEPHONE ENCOUNTER
Discussed provider message with patient. Is out of network in [place].    No c/o symptoms at this time.    Advised to contact insurance and see coverage for urgent care in area. If episodic symptoms should go to ER.    Patient stated will also follow up with [facility] when back.    Angelica Velazco RN

## 2023-06-06 DIAGNOSIS — L70.9 ACNE, UNSPECIFIED ACNE TYPE: ICD-10-CM

## 2023-06-09 RX ORDER — SPIRONOLACTONE 50 MG/1
50 TABLET, FILM COATED ORAL DAILY
Qty: 90 TABLET | Refills: 0 | OUTPATIENT
Start: 2023-06-09

## 2023-06-09 NOTE — TELEPHONE ENCOUNTER
I have not prescribed this for patient before.  Will need to contact the provider that did.    Bhupinder

## 2023-06-09 NOTE — TELEPHONE ENCOUNTER
Sent [employer] message asking patient to reach out to their prescribing provider for refills.    Luz Maria Franco

## 2023-07-02 ENCOUNTER — E-VISIT (OUTPATIENT)
Dept: URGENT CARE | Facility: CLINIC | Age: 23
End: 2023-07-02
Payer: COMMERCIAL

## 2023-07-02 DIAGNOSIS — R30.0 DIFFICULT OR PAINFUL URINATION: Primary | ICD-10-CM

## 2023-07-02 PROCEDURE — 99421 OL DIG E/M SVC 5-10 MIN: CPT | Performed by: INTERNAL MEDICINE

## 2023-07-02 NOTE — PATIENT INSTRUCTIONS
Dear Sara Ruiz,     After reviewing your responses, I would like you to come in for a urine test to make sure we treat you correctly. This urine test is to evaluate you for a possible urinary tract infection, and should be scheduled for today or tomorrow. Schedule a Lab Only appointment here.     Lab appointments are not available at most locations on the weekends. If no Lab Only appointment is available, you should be seen in any of our convenient Walk-in or Urgent Care Centers, which can be found on our website here.     You will receive instructions with your results in [employer] once they are available.     If your symptoms worsen, you develop pain in your back or stomach, develop fevers, or are not improving in 5 days, please contact your primary care provider for an appointment or visit a Walk-in or Urgent Care Center to be seen.     Thanks again for choosing us as your health care partner,     Lesly Pardo MD

## 2023-09-13 DIAGNOSIS — L70.8 OTHER ACNE: ICD-10-CM

## 2023-09-13 RX ORDER — SPIRONOLACTONE 25 MG/1
25 TABLET ORAL DAILY
Qty: 90 TABLET | Refills: 0 | Status: SHIPPED | OUTPATIENT
Start: 2023-09-13 | End: 2023-12-26

## 2023-09-20 ENCOUNTER — TELEPHONE (OUTPATIENT)
Dept: FAMILY MEDICINE | Facility: CLINIC | Age: 23
End: 2023-09-20
Payer: COMMERCIAL

## 2023-09-20 NOTE — TELEPHONE ENCOUNTER
Reason for Call:  Appointment Request    Patient requesting this type of appt: Chronic Diease Management/Medication/Follow-Up    Requested provider: Bhupinder Simmons    Reason patient unable to be scheduled: Not within requested timeframe    When does patient want to be seen/preferred time: 1-2 days    Comments: Pt needs to be seen sooner for a follow up after a virtual visit. This follow up is requested by PCP. Needs to be seen asap.     Could we send this information to you in [employer] or would you prefer to receive a phone call?:   Patient would prefer a phone call   Okay to leave a detailed message?: Yes at Cell number on file:    Telephone Information:   Mobile [phone]       Call taken on 9/20/2023 at 11:01 AM by Cira Chase

## 2023-09-20 NOTE — TELEPHONE ENCOUNTER
Provider wanted in clinic visit with patient.Note from canceled e-visit 9/16/23:    Thank you for choosing us for your care. I think an in-clinic visit would be best next steps based on your symptoms. Please schedule a clinic appointment; you won t be charged for this eVisit.       You can schedule an appointment right here in [facility], or call [phone]               [facility]    Angelica Velazco RN

## 2023-09-22 ENCOUNTER — OFFICE VISIT (OUTPATIENT)
Dept: FAMILY MEDICINE | Facility: CLINIC | Age: 23
End: 2023-09-22
Payer: COMMERCIAL

## 2023-09-22 VITALS
HEIGHT: 67 IN | BODY MASS INDEX: 24.8 KG/M2 | OXYGEN SATURATION: 97 % | WEIGHT: 158 LBS | DIASTOLIC BLOOD PRESSURE: 40 MMHG | RESPIRATION RATE: 20 BRPM | HEART RATE: 103 BPM | TEMPERATURE: 98.1 F | SYSTOLIC BLOOD PRESSURE: 100 MMHG

## 2023-09-22 DIAGNOSIS — R79.89 LOW TSH LEVEL: ICD-10-CM

## 2023-09-22 DIAGNOSIS — R55 NEAR SYNCOPE: Primary | ICD-10-CM

## 2023-09-22 DIAGNOSIS — R00.0 TACHYCARDIA: ICD-10-CM

## 2023-09-22 LAB
T4 FREE SERPL-MCNC: 1.94 NG/DL (ref 0.9–1.7)
TSH SERPL DL<=0.005 MIU/L-ACNC: <0.01 UIU/ML (ref 0.3–4.2)

## 2023-09-22 PROCEDURE — 84439 ASSAY OF FREE THYROXINE: CPT | Performed by: NURSE PRACTITIONER

## 2023-09-22 PROCEDURE — 84443 ASSAY THYROID STIM HORMONE: CPT | Performed by: NURSE PRACTITIONER

## 2023-09-22 PROCEDURE — 99213 OFFICE O/P EST LOW 20 MIN: CPT | Performed by: NURSE PRACTITIONER

## 2023-09-22 PROCEDURE — 36415 COLL VENOUS BLD VENIPUNCTURE: CPT | Performed by: NURSE PRACTITIONER

## 2023-09-22 ASSESSMENT — PAIN SCALES - GENERAL: PAINLEVEL: NO PAIN (0)

## 2023-09-22 ASSESSMENT — PATIENT HEALTH QUESTIONNAIRE - PHQ9
SUM OF ALL RESPONSES TO PHQ QUESTIONS 1-9: 14
SUM OF ALL RESPONSES TO PHQ QUESTIONS 1-9: 14
10. IF YOU CHECKED OFF ANY PROBLEMS, HOW DIFFICULT HAVE THESE PROBLEMS MADE IT FOR YOU TO DO YOUR WORK, TAKE CARE OF THINGS AT HOME, OR GET ALONG WITH OTHER PEOPLE: SOMEWHAT DIFFICULT

## 2023-09-22 NOTE — Clinical Note
I see patient is unable to get into endocrinology until feb of 2024. I would be able to place an E-consult for $125 otherwise I would recommend that she calls around to other endocrinologists to see if she can get in sooner. If she finds somewhere else she would like to get in sooner I will write a referral for any place she would like.   OLESYA Lenz CNP

## 2023-09-22 NOTE — PROGRESS NOTES
"  Assessment & Plan     Near syncope  Tachycardia  Acute; EKG was normal, zio patch showed tachycardia and PVC/PAC's, recent lab work was normal aside from TSH which will repeat today. Based on h/p will have patient follow up with cardiology d/t ongoing symptoms. Patient noting to have symptoms only while taking report at work. 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 Discussed underlying anxiety symptoms patient denies concerns for this at this time. Patient does see psych in [place]. Recently stopped taking her Buspar and quit sertraline in January.     - TSH with free T4 reflex; Future  - Adult Cardiology Eval  Referral; Future  - TSH with free T4 reflex    Low TSH level  Acute; will repeat TSH levels prior was suggestive of hyperthyroidism; advised if continuing to be abnormal to follow up with endocrinology.     - TSH with free T4 reflex; Future  - Adult Endocrinology  Referral; Future  - TSH with free T4 reflex      Patient does live in [place] strongly encouraged her to establish care in [place] d/t long commute. Patient is in the process of obtaining insurance in [place].     Post Medication Reconciliation Status:  Discharge medications reconciled, continue medications without change      OLESYA Lenz CNP  [facility] MADY Clemente is a 23 year old, presenting for the following health issues:  ER F/U        9/22/2023    12:46 PM   Additional Questions   Roomed by Chana         9/22/2023    12:46 PM   Patient Reported Additional Medications   Patient reports taking the following new medications none       History of Present Illness       Headaches:   Since the patient's last clinic visit, headaches are: worsened  The patient is getting headaches:  Weekly  She is not able to do normal daily activities when she has a migraine.  The patient is taking the following rescue/relief medications:  Tylenol and Excedrin   Patient states \"The relief is inconsistent\" from the rescue/relief " "medications.   The patient is taking the following medications to prevent migraines:  No medications to prevent migraines  In the past 4 weeks, the patient has gone to an Urgent Care or Emergency Room 0 times times due to headaches.    Reason for visit:  Near syncope / hyperthyroid?    She eats 2-3 servings of fruits and vegetables daily.She consumes 0 sweetened beverage(s) daily.She exercises with enough effort to increase her heart rate 30 to 60 minutes per day.  She exercises with enough effort to increase her heart rate 4 days per week.   She is taking medications regularly.       ED/UC Followup:    Facility:  [facility]  Date of visit: 9-15-23  Reason for visit: Near Syncope  Current Status: fine    Patient presents to clinic for follow up of several near syncopal events. Was recently seen in ED in which EKG was normal as well as lab work. TSH was found to be low with slightly elevated T4 level suggestive of hyperthyroidism. Patient states she is always running around 100 bpm; states she is unable to exercise d/t heart rate elevating too much. Does not drink caffeine. States symptoms usually occur when taking report at work. 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 States she also had a syncope episode in January while in the lecture zacarias at school. Since completing school feels her anxiety has improved significantly. Is currently seeing a therapist in [place] for depression.       Review of Systems   Constitutional, HEENT, cardiovascular, pulmonary, gi and gu systems are negative, except as otherwise noted.      Objective    /40 (BP Location: Left arm, Patient Position: Sitting, Cuff Size: Adult Regular)   Pulse 103   Temp 98.1  F (36.7  C) (Oral)   Resp 20   Ht 1.702 m (5' 7\")   Wt 71.7 kg (158 lb)   SpO2 97%   BMI 24.75 kg/m    Body mass index is 24.75 kg/m .  Physical Exam   GENERAL: healthy, alert and no distress  RESP: lungs clear to auscultation - no rales, rhonchi or wheezes  CV: " regular rate and rhythm, normal S1 S2, no S3 or S4, no murmur, click or rub, no peripheral edema and peripheral pulses strong  MS: no gross musculoskeletal defects noted, no edema  PSYCH: mentation appears normal, affect normal/bright

## 2023-09-28 NOTE — PROGRESS NOTES
Soledad Tran APRN CNP  P Rm Triage  I see patient is unable to get into endocrinology until feb of 2024. I would be able to place an E-consult for $125 otherwise I would recommend that she calls around to other endocrinologists to see if she can get in sooner. If she finds somewhere else she would like to get in sooner I will write a referral for any place she would like.    OLESYA Lenz CNP    Called and left a message to call us back.

## 2023-09-29 ENCOUNTER — TELEPHONE (OUTPATIENT)
Dept: FAMILY MEDICINE | Facility: CLINIC | Age: 23
End: 2023-09-29
Payer: COMMERCIAL

## 2023-09-29 NOTE — TELEPHONE ENCOUNTER
Soledad Tran APRN CNP  P Rm Triage  I see patient is unable to get into endocrinology until feb of 2024. I would be able to place an E-consult for $125 otherwise I would recommend that she calls around to other endocrinologists to see if she can get in sooner. If she finds somewhere else she would like to get in sooner I will write a referral for any place she would like.    OLESYA Lenz CNP          Contacted patient and reviewed above message from provider. Patient verbalized understanding. Patient states that she will call around to see if she can find another endocrinologist who can see her sooner and then return call to the clinic.    Rachele GARCIA RN, BSN, PHN

## 2023-10-02 NOTE — CONFIDENTIAL NOTE
RECORDS RECEIVED FROM: internal /ce   DATE RECEIVED: 10.4.23    NOTES (FOR ALL VISITS) STATUS DETAILS   OFFICE NOTES from referring provider internal  Soledad Tran      MEDICATION LIST internal       LABS     DIABETES: HBGA1C, CREATININE, FASTING LIPIDS, MICROALBUMIN URINE, POTASSIUM, TSH, T4    THYROID: TSH, T4, CBC, THYRODLONULIN, TOTAL T3, FREE T4, CALCITONIN, CEA internal /ce T4- 9.22.23   TSH- 9.22.23   Received labs- 9.15.23   Vitamin D- 10.27.22  BMP- 10.27.22  Cbc- 10.27.22

## 2023-10-04 ENCOUNTER — PRE VISIT (OUTPATIENT)
Dept: ENDOCRINOLOGY | Facility: CLINIC | Age: 23
End: 2023-10-04

## 2023-10-04 ENCOUNTER — VIRTUAL VISIT (OUTPATIENT)
Dept: ENDOCRINOLOGY | Facility: CLINIC | Age: 23
End: 2023-10-04
Attending: NURSE PRACTITIONER
Payer: COMMERCIAL

## 2023-10-04 ENCOUNTER — TELEPHONE (OUTPATIENT)
Dept: ENDOCRINOLOGY | Facility: CLINIC | Age: 23
End: 2023-10-04

## 2023-10-04 DIAGNOSIS — R79.89 LOW TSH LEVEL: ICD-10-CM

## 2023-10-04 DIAGNOSIS — E05.90 THYROTOXICOSIS WITHOUT THYROID STORM, UNSPECIFIED THYROTOXICOSIS TYPE: Primary | ICD-10-CM

## 2023-10-04 PROCEDURE — 99205 OFFICE O/P NEW HI 60 MIN: CPT | Mod: VID | Performed by: INTERNAL MEDICINE

## 2023-10-04 NOTE — LETTER
10/4/2023       RE: Sara Ruiz  [address]  [facility] [address]     Dear Colleague,    Thank you for referring your patient, Sara Ruiz, to the [facility] [place] [facility]. Please see a copy of my visit note below.    Endocrinology and Diabetes Clinic Visit     Reason for Consult: Low TSH  Consulting Provider: Soledad Tran   PCP: Bhupinder Simmons    Subjective:   Sara Ruiz is a 23 year old female seen today for a new consultation. This visit was completed virtually.     She presented to the ED on 9/15/23 with near syncopal episodes and tachycardia, and was found to have low TSH <0.01 and mild elevation in FT4 at 1.6 (range 0.7-1.5). EKG showed sinus tachycardia with . On repeat labs on 9/22/23 with her PCP FT4 was slightly higher (on a different assay) at 1.94 (range 0.9-1.7).     She has history of syncopal/near syncopal episodes which have been happening more frequently since January. She has lost consciousness 3 times in her life, once in highschool, once in nursing school, and in January this year. During the January episode she was sitting in a lecture at school, and noticed her usual pre-syncopal symptoms of nausea, tunnel vision, loss of hearing, and then she passed out. This lasted a few seconds before she regained consciousness. She was wearing her watch and pulse was slow during the episode.     She has always had a high resting heart rate (typically around 100) and exercise intolerance. This has not really changed recently.     She has no prior history of thyroid disease. There is also no family history of thyroid disease.     Weight is up 15 lbs in the past several months, this corresponds to stopping her stimulant for ADHD in May when school ended (baseline 135 lbs, now 155 lbs). She endorses temperature intolerance, she tends to feel cold (this is not new but may be worse  recenltly). No tremor. No change in stools.     She was ill with COVID 1.5 months ago, tested positive 8/21/2023. She had URI symptoms.     Menses started at age 15 and have always been irregular. She takes an OCP for this. No recent changes. She has never been pregnant.     Supplements - prenatal MVI only, no other supplements   Biotin - no   Iodine exposure - no   Contrast exposure - no  Neck pain/swelling - no   Eye symptoms - she has chronic dry eye, not worse recently     Medications:   Current Outpatient Medications   Medication Sig Dispense Refill    calcium carbonate (OS-JOSÉ) 500 MG tablet       desvenlafaxine succinate (PRISTIQ) 25 MG 24 hr tablet Take 1 tablet by mouth daily at 2 pm      dicyclomine (BENTYL) 20 MG tablet Take 1 tablet (20 mg) by mouth 4 times daily as needed (cramping, diarrhea) 40 tablet 0    JUNEL FE 1/20 1-20 MG-MCG tablet Take 1 tablet by mouth daily      magnesium 250 MG tablet       mirtazapine (REMERON) 15 MG tablet Take 15 mg by mouth      spironolactone (ALDACTONE) 25 MG tablet TAKE 1 TABLET BY MOUTH EVERY DAY 90 tablet 0    triamcinolone (KENALOG) 0.1 % external cream APPLY 1 APPLICATION TOPICALLY DAILY TO LEGS AS NEEDED FOR FLARES 45 g 1       Past Medical History:  No past medical history on file.    No past surgical history on file.    Patient Active Problem List   Diagnosis    Anxiety    Attention deficit disorder of adult    Moderate major depression (H)    Tachycardia    Diarrhea, unspecified type     Acne, for which she takes spironolactone  Irregular menses, now taking an OCP  Anxiety   ADHD     Family History:  There is no family history of thyroid disease.   Her father has type 2 diabetes.     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     Physical Examination:  Wt Readings from Last 4 Encounters:   09/22/23 71.7 kg (158 lb)   12/20/22 59.7 kg (131 lb 11.2 oz)   11/25/22 59 kg (130 lb)   10/27/22 59.3 kg (130 lb 11.2 oz)     Estimated body mass index is 24.75 kg/m  as calculated from the following:    Height as of 9/22/23: 1.702 m (5' 7\").    Weight as of 9/22/23: 71.7 kg (158 lb).    BP Readings from Last 3 Encounters:   09/22/23 100/40   03/11/23 107/68   12/20/22 100/62     GENERAL: Healthy, alert and no distress  EYES: Eyes grossly normal to inspection.  No discharge or erythema, or obvious scleral/conjunctival abnormalities.  NECK: No asymmetry, visible masses or scars. There is no visible goiter.   RESP: No audible wheeze, cough, or visible cyanosis.  No increased work of breathing.    SKIN: Visible skin clear. No significant rash, abnormal pigmentation or lesions.  NEURO: Cranial nerves grossly intact.  Mentation and speech appropriate.  PSYCH: Affect normal/bright, judgement and insight intact, normal speech and appearance well-groomed.    Labs and Studies:      ENDO THYROID LABS-P      TSH TSH FREE T4   Latest Ref Rng 0.40 - 4.00 mU/L 0.30 - 4.20 uIU/mL 0.90 - 1.70 ng/dL   7/15/2020  1:43 PM 1.14      10/27/2022  9:00 AM 2.08      9/22/2023  1:23 PM  <0.01 (L)  1.94 (H)      9/15/2023 Labs ([place])   Free T4 1.6 (0.7-1.5)   TSH < 0.01    Assessment:  1. Thyrotoxicosis. The differential includes Graves disease, toxic adenoma/toxic multinodular goiter, and thyroiditis. She did have COVID 6 weeks ago and it is possible that this was a trigger.   2. Baseline tachycardia.  3. Syncope/near syncopal episodes of unknown etiology. She will see cardiology for workup.   4. Anxiety.   5. Irregular menses and acne, managed with spironolactone and OCP.     Plan:   - Check repeat TSH and FT4 and will also obtain a total T3 level and TSI. She " is not in the [facility] and would prefer to complete these labs closer to home if possible.   - Our plan will be to obtain a GOLDSTEIN uptake and scan as the next step, unless TSI is positive. She would be able to come home to [place] for a few days to complete this test.   - Symptoms are mild and we elected not to start a beta blocker today, pending cardiology evaluation for syncope.     We will communicate through [employer] with lab results. Follow up in clinic in 3 months.     Video-Visit Details    Type of Service:  Video Visit    Video Start Time: 10:05 AM    Video End Time: 10:37 AM    Platform Used for Video Visit: ITY[employer]     Provider Location: On-site      65 minutes spent on the date of the encounter doing chart review, history and exam, documentation and further activities per the note.       Elizabeth Washington MD  Endocrinology and Diabetes   [phone]

## 2023-10-04 NOTE — TELEPHONE ENCOUNTER
----- Message from Elizabeth Washington MD sent at 10/4/2023 11:02 AM CDT -----  Regarding: outside lab slip  This patient needs an outside lab slip for TSH, free T4, total T3 and TSI. Orders are in Epic. Can you help to arrange this?    Thank you!   Elizabeth

## 2023-10-04 NOTE — NURSING NOTE
Is the patient currently in the state of [place]? YES    Visit mode:VIDEO    If the visit is dropped, the patient can be reconnected by: VIDEO VISIT: Send to e-mail at: [email or web address]    Will anyone else be joining the visit? NO  (If patient encounters technical issues they should call [phone])    How would you like to obtain your AVS? MyChart    Are changes needed to the allergy or medication list? No    Reason for visit: Consult    Adriana LUNA

## 2023-10-04 NOTE — PROGRESS NOTES
Endocrinology and Diabetes Clinic Visit     Reason for Consult: Low TSH  Consulting Provider: Soledad Tran   PCP: Bhupinder Simmons    Subjective:   Sara Ruiz is a 23 year old female seen today for a new consultation. This visit was completed virtually.     She presented to the ED on 9/15/23 with near syncopal episodes and tachycardia, and was found to have low TSH <0.01 and mild elevation in FT4 at 1.6 (range 0.7-1.5). EKG showed sinus tachycardia with . On repeat labs on 9/22/23 with her PCP FT4 was slightly higher (on a different assay) at 1.94 (range 0.9-1.7).     She has history of syncopal/near syncopal episodes which have been happening more frequently since January. She has lost consciousness 3 times in her life, once in highschool, once in nursing school, and in January this year. During the January episode she was sitting in a lecture at school, and noticed her usual pre-syncopal symptoms of nausea, tunnel vision, loss of hearing, and then she passed out. This lasted a few seconds before she regained consciousness. She was wearing her watch and pulse was slow during the episode.     She has always had a high resting heart rate (typically around 100) and exercise intolerance. This has not really changed recently.     She has no prior history of thyroid disease. There is also no family history of thyroid disease.     Weight is up 15 lbs in the past several months, this corresponds to stopping her stimulant for ADHD in May when school ended (baseline 135 lbs, now 155 lbs). She endorses temperature intolerance, she tends to feel cold (this is not new but may be worse recenltly). No tremor. No change in stools.     She was ill with COVID 1.5 months ago, tested positive 8/21/2023. She had URI symptoms.     Menses started at age 15 and have always been irregular. She takes an OCP for this. No recent changes. She has never been pregnant.     Supplements - prenatal MVI only, no other supplements    Biotin - no   Iodine exposure - no   Contrast exposure - no  Neck pain/swelling - no   Eye symptoms - she has chronic dry eye, not worse recently     Medications:   Current Outpatient Medications   Medication Sig Dispense Refill    calcium carbonate (OS-JOSÉ) 500 MG tablet       desvenlafaxine succinate (PRISTIQ) 25 MG 24 hr tablet Take 1 tablet by mouth daily at 2 pm      dicyclomine (BENTYL) 20 MG tablet Take 1 tablet (20 mg) by mouth 4 times daily as needed (cramping, diarrhea) 40 tablet 0    JUNEL FE 1/20 1-20 MG-MCG tablet Take 1 tablet by mouth daily      magnesium 250 MG tablet       mirtazapine (REMERON) 15 MG tablet Take 15 mg by mouth      spironolactone (ALDACTONE) 25 MG tablet TAKE 1 TABLET BY MOUTH EVERY DAY 90 tablet 0    triamcinolone (KENALOG) 0.1 % external cream APPLY 1 APPLICATION TOPICALLY DAILY TO LEGS AS NEEDED FOR FLARES 45 g 1       Past Medical History:  No past medical history on file.    No past surgical history on file.    Patient Active Problem List   Diagnosis    Anxiety    Attention deficit disorder of adult    Moderate major depression (H)    Tachycardia    Diarrhea, unspecified type     Acne, for which she takes spironolactone  Irregular menses, now taking an OCP  Anxiety   ADHD     Family History:  There is no family history of thyroid disease.   Her father has type 2 diabetes.     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     Physical Examination:  Wt Readings from Last 4 Encounters:   09/22/23 71.7 kg (158 lb)   12/20/22 59.7 kg (131 lb 11.2 oz)   11/25/22 59 kg (130 lb)   10/27/22 59.3 kg (130 lb 11.2 oz)     Estimated body mass index is  "24.75 kg/m  as calculated from the following:    Height as of 9/22/23: 1.702 m (5' 7\").    Weight as of 9/22/23: 71.7 kg (158 lb).    BP Readings from Last 3 Encounters:   09/22/23 100/40   03/11/23 107/68   12/20/22 100/62     GENERAL: Healthy, alert and no distress  EYES: Eyes grossly normal to inspection.  No discharge or erythema, or obvious scleral/conjunctival abnormalities.  NECK: No asymmetry, visible masses or scars. There is no visible goiter.   RESP: No audible wheeze, cough, or visible cyanosis.  No increased work of breathing.    SKIN: Visible skin clear. No significant rash, abnormal pigmentation or lesions.  NEURO: Cranial nerves grossly intact.  Mentation and speech appropriate.  PSYCH: Affect normal/bright, judgement and insight intact, normal speech and appearance well-groomed.    Labs and Studies:      ENDO THYROID LABS-P      TSH TSH FREE T4   Latest Ref Rng 0.40 - 4.00 mU/L 0.30 - 4.20 uIU/mL 0.90 - 1.70 ng/dL   7/15/2020  1:43 PM 1.14      10/27/2022  9:00 AM 2.08      9/22/2023  1:23 PM  <0.01 (L)  1.94 (H)      9/15/2023 Labs ([facility])   Free T4 1.6 (0.7-1.5)   TSH < 0.01    Assessment:  1. Thyrotoxicosis. The differential includes Graves disease, toxic adenoma/toxic multinodular goiter, and thyroiditis. She did have COVID 6 weeks ago and it is possible that this was a trigger.   2. Baseline tachycardia.  3. Syncope/near syncopal episodes of unknown etiology. She will see cardiology for workup.   4. Anxiety.   5. Irregular menses and acne, managed with spironolactone and OCP.     Plan:   - Check repeat TSH and FT4 and will also obtain a total T3 level and TSI. She is not in the [facility] and would prefer to complete these labs closer to home if possible.   - Our plan will be to obtain a GOLDSTEIN uptake and scan as the next step, unless TSI is positive. She would be able to come home to [place] for a few days to complete this test.   - Symptoms are mild and we elected not to start a beta " blocker today, pending cardiology evaluation for syncope.     We will communicate through [email or web address] with lab results. Follow up in clinic in 3 months.     Video-Visit Details    Type of Service:  Video Visit    Video Start Time: 10:05 AM    Video End Time: 10:37 AM    Platform Used for Video Visit: [employer]     Provider Location: On-site      65 minutes spent on the date of the encounter doing chart review, history and exam, documentation and further activities per the note.       Elizabeth Washington MD  Endocrinology and Diabetes   [phone]

## 2023-10-09 ENCOUNTER — MYC MEDICAL ADVICE (OUTPATIENT)
Dept: ENDOCRINOLOGY | Facility: CLINIC | Age: 23
End: 2023-10-09
Payer: COMMERCIAL

## 2023-10-19 ENCOUNTER — MYC MEDICAL ADVICE (OUTPATIENT)
Dept: ENDOCRINOLOGY | Facility: CLINIC | Age: 23
End: 2023-10-19
Payer: COMMERCIAL

## 2023-10-19 ENCOUNTER — TELEPHONE (OUTPATIENT)
Dept: ENDOCRINOLOGY | Facility: CLINIC | Age: 23
End: 2023-10-19
Payer: COMMERCIAL

## 2023-10-19 DIAGNOSIS — E06.9 THYROIDITIS: Primary | ICD-10-CM

## 2023-10-19 NOTE — TELEPHONE ENCOUNTER
[facility] Call Center    Phone Message    May a detailed message be left on voicemail: yes     Reason for Call: Pt calling to let Dr. Washington know that her thyroid results are available for review (completed @ [facility], visible through Care Everywhere--pt was instructed to let Dr. Washington know when those results became available).     Action Taken: Other: Endo    Travel Screening: Not Applicable

## 2023-10-23 NOTE — TELEPHONE ENCOUNTER
mAPPn sent to patient regarding results, which I have reviewed.   Elizabeth Washington MD  Endocrinology and Diabetes   [phone]

## 2023-10-24 NOTE — TELEPHONE ENCOUNTER
Faxed lab orders to [facility] in [place], [place] lab fax # : [phone] ;     Note: Laboratory phone # : [phone]       3 years to 8 years (need ONE to TWO doses)...

## 2023-11-09 ENCOUNTER — OFFICE VISIT (OUTPATIENT)
Dept: CARDIOLOGY | Facility: CLINIC | Age: 23
End: 2023-11-09
Attending: NURSE PRACTITIONER
Payer: COMMERCIAL

## 2023-11-09 VITALS
SYSTOLIC BLOOD PRESSURE: 113 MMHG | OXYGEN SATURATION: 98 % | HEART RATE: 90 BPM | HEIGHT: 67 IN | WEIGHT: 160.6 LBS | DIASTOLIC BLOOD PRESSURE: 74 MMHG | BODY MASS INDEX: 25.21 KG/M2

## 2023-11-09 DIAGNOSIS — R55 NEAR SYNCOPE: ICD-10-CM

## 2023-11-09 DIAGNOSIS — R55 VASOVAGAL SYNDROME: Primary | ICD-10-CM

## 2023-11-09 DIAGNOSIS — R00.0 TACHYCARDIA: ICD-10-CM

## 2023-11-09 PROCEDURE — 99205 OFFICE O/P NEW HI 60 MIN: CPT | Performed by: INTERNAL MEDICINE

## 2023-11-09 NOTE — LETTER
"11/9/2023    Bhupinder Simmons PA-C  [address] [facility] [facility] [address]    RE: Sara Ruiz       Dear Colleague,     I had the pleasure of seeing Sara Ruiz in the [facility].    SERVICE DATE: November 9, 2023    REFERRING PROVIDER:  OLESYA Lenz CNP  [address] [place] [address]    PRIMARY CARE AND REFERRING PROVIDER:  Bhupinder Simmons  [address] [place] [address]    REASON FOR VISIT:  Near syncope and sinus tachycardia.    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  She is here because she says her insurance coverage does not cover [employer].  She was unaccompanied today.    Her medical history significant for chronic depression and anxiety and poor appetite for which she has been on mirtazapine for over 5 years, acne (on spironolactone), never tobacco user, BMI 25, history of hyperthyroidism for which she sees an endocrinologist and has been managed without therapy due to improvement in labs and deemed secondary to possible thyroiditis from COVID-19 infection.    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    She has a long history of presyncope and fainting, from when she was a teenager.  She gets a typical prodrome which she is very familiar with which includes nausea, followed by things going dark, dizziness, near fainting and occasionally \"passing out\".  The actual completely passing out episodes are infrequent.  She notes them happening when she was 15 years old, subsequently at 20 years old, and most recently several months ago in January 2023.  The typical triggers in the past, at least, used to be not eating enough, not drinking enough, being sleep deprived and tired.    For a few years, she has suffered from lack of appetite and losing weight " and was put on mirtazapine at which has helped stabilize those issues.  She now tries to eat frequent meals.  She endorses not being physically active and not undertaking any targeted exercise since high school (i.e. 5 years).  She had a bad bout of COVID-19 in 2021 and again earlier this year.  Not hospitalized, no ICU.  No history of substance abuse.  Takes spironolactone 25 mg daily for acne (was prescribed by her dermatologist).    Since being in nursing school, she has been busy with schoolwork.  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  Her heart monitor, resting ECG have been normal other than sinus tachycardia.  She has had persistent sinus tachycardia for several months.  As noted above, this is since her thyroid labs have been abnormal with a very suppressed TSH and a high free T4 suggestive of hypothyroidism but more recently her free T4 has normalized.  She has not been on any therapy for it.    There is a family history of vasovagal type fainting but no diagnosed arrhythmias, no ICD.    /84 and orthostatic vitals do not demonstrate orthostatic hypotension, pulse sinus tachycardia of 90 bpm BPM.    I reviewed her labs.  Normal electrolytes, normal renal panel, normal CBC with a hemoglobin of 13.7.  Abnormal thyroid labs with suppressed TSH of 0.02, normal free T4 of 0.8 (previously high at 1.9 suggestive of thyrotoxicosis).    I independently interpreted her Holter monitor from 4/22/2021, ECG dated 4/22/2021, recent Zio patch heart monitor dated 3/17/2023.    ECG shows sinus tachycardia of 105 bpm, normal cardiac intervals.    Holter monitor from 2021 showed sinus rhythm with an average heart rate of 90 bpm no arrhythmias and no patient reported symptoms.    Recent 7-day Holter monitor shows sinus tachycardia with an average heart rate of 105 bpm, no significant  ectopy or arrhythmias, patient reported symptoms associated with sinus tachycardia.    On exam - Regular heart sounds, no murmur, no carotid bruit.  Lungs are clear to auscultation.  No lower extremity edema.    DIAGNOSES/ASSESSMENT:  Vasovagal syndrome with worsening near syncope episodes.  Patient gives a classic history of vasovagal syndrome, I suspect the last few years of not eating optimally, very sedentary lifestyle, and COVID infections have exacerbated the frequency.  Nevertheless, structural heart disease should be ruled out.  Sinus tachycardia.  TSH remains suppressed.  I think that is contributing.  Follow-up with endocrinologist.  Background history of stable depression and anxiety.  On mirtazapine.    PLAN:  Transthoracic echocardiogram with bubble study to be done as soon as possible to rule out structural heart disease that may be causing worsening of her vasovagal syndrome.  My nurse will communicate results to her.  Follow-up with your endocrinologist to assess suppressed TSH/hyperthyroidism which may be contributing to sinus tachycardia.  Follow-up with your dermatologist for an alternative to spironolactone for your acne.  As discussed, spironolactone is a diuretic and will make vasovagal syndrome worse.  Lifestyle modification counseling to help with the near syncope episodes -adequate hydration daily with 80 to 100 ounces of water, strength training 3 times a week, regular aerobic exercise for 30 minutes daily, slight increase in dietary sodium intake, eat regular meals.  I taught her maneuvers to do at the onset of her prodrome so near syncope/syncope could be aborted.  Virtual follow-up with me in 3 months.  She lives and works in [facility] and traveling in winter will be challenging.      Anthony Salmeron MD      New patient.  Today's clinic visit entailed:  Review of prior external note(s) from - [facility] information from [facility] reviewed  Review of the result(s) of each  "unique test - I reviewed results of CBC, renal panel, lipid panel, thyroid panel  The following tests were independently interpreted by me as noted in my documentation: ECG, Holter monitor, Zio patch heart monitor.  Ordering of each unique test  Prescription drug management  60 minutes spent by me on the date of the encounter doing chart review, history and exam, documentation and further activities per the note            Vitals: /74 (BP Location: Right arm, Patient Position: Supine, Cuff Size: Adult Regular)   Pulse 90   Ht 1.702 m (5' 7\")   Wt 72.8 kg (160 lb 9.6 oz)   SpO2 98%   BMI 25.15 kg/m    Wt Readings from Last 5 Encounters:   11/09/23 72.8 kg (160 lb 9.6 oz)   09/22/23 71.7 kg (158 lb)   12/20/22 59.7 kg (131 lb 11.2 oz)   11/25/22 59 kg (130 lb)   10/27/22 59.3 kg (130 lb 11.2 oz)         Orders Placed This Encounter   Procedures    Follow-Up with Cardiology    Echocardiogram Complete           CURRENT MEDICATIONS:  Current Outpatient Medications   Medication Sig Dispense Refill    calcium carbonate (OS-JOSÉ) 500 MG tablet       dicyclomine (BENTYL) 20 MG tablet Take 1 tablet (20 mg) by mouth 4 times daily as needed (cramping, diarrhea) 40 tablet 0    JUNEL FE 1/20 1-20 MG-MCG tablet Take 1 tablet by mouth daily      magnesium 250 MG tablet       mirtazapine (REMERON) 15 MG tablet Take 15 mg by mouth      spironolactone (ALDACTONE) 25 MG tablet TAKE 1 TABLET BY MOUTH EVERY DAY 90 tablet 0    triamcinolone (KENALOG) 0.1 % external cream APPLY 1 APPLICATION TOPICALLY DAILY TO LEGS AS NEEDED FOR FLARES 45 g 1         ALLERGIES:  No Known Allergies    PAST MEDICAL HISTORY:    History reviewed. No pertinent past medical history.    PAST SURGICAL HISTORY:    History reviewed. No pertinent surgical history.    FAMILY HISTORY:    Family History   Problem Relation Age of Onset    Pacemaker Paternal Grandfather     Heart Failure Paternal Grandfather        SOCIAL HISTORY:    Social History "     Socioeconomic History    Marital status: Single     Spouse name: None    Number of children: None    Years of education: None    Highest education level: None   Tobacco Use    Smoking status: Never     Passive exposure: Never    Smokeless tobacco: Never   Vaping Use    Vaping Use: Never used   Substance and Sexual Activity    Alcohol use: Never    Drug use: Never     Social Determinants of Health     Financial Resource Strain: Low Risk  (9/22/2023)    Financial Resource Strain     Within the past 12 months, have you or your family members you live with been unable to get utilities (heat, electricity) when it was really needed?: No   Food Insecurity: Low Risk  (9/22/2023)    Food Insecurity     Within the past 12 months, did you worry that your food would run out before you got money to buy more?: No     Within the past 12 months, did the food you bought just not last and you didn t have money to get more?: No   Transportation Needs: Low Risk  (9/22/2023)    Transportation Needs     Within the past 12 months, has lack of transportation kept you from medical appointments, getting your medicines, non-medical meetings or appointments, work, or from getting things that you need?: No   Interpersonal Safety: Low Risk  (9/22/2023)    Interpersonal Safety     Do you feel physically and emotionally safe where you currently live?: Yes     Within the past 12 months, have you been hit, slapped, kicked or otherwise physically hurt by someone?: No     Within the past 12 months, have you been humiliated or emotionally abused in other ways by your partner or ex-partner?: No   Housing Stability: Low Risk  (9/22/2023)    Housing Stability     Do you have housing? : Yes     Are you worried about losing your housing?: No       Thank you for allowing me to participate in the care of your patient.      Sincerely,     Anthony Salmeron MD     [facility]  cc:   Soledad Tran  APRN [facility]  [address] [place] [address]

## 2023-11-09 NOTE — PROGRESS NOTES
"  SERVICE DATE: November 9, 2023    REFERRING PROVIDER:  OLESYA Lenz [facility]  [address] [place] [address]    PRIMARY CARE AND REFERRING PROVIDER:  Bhupinder Simmons  [address] [facility] [address]    REASON FOR VISIT:  Near syncope and sinus tachycardia.    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  She is here because she says her insurance coverage does not cover [employer].  She was unaccompanied today.    Her medical history significant for chronic depression and anxiety and poor appetite for which she has been on mirtazapine for over 5 years, acne (on spironolactone), never tobacco user, BMI 25, history of hyperthyroidism for which she sees an endocrinologist and has been managed without therapy due to improvement in labs and deemed secondary to possible thyroiditis from COVID-19 infection.    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    She has a long history of presyncope and fainting, from when she was a teenager.  She gets a typical prodrome which she is very familiar with which includes nausea, followed by things going dark, dizziness, near fainting and occasionally \"passing out\".  The actual completely passing out episodes are infrequent.  She notes them happening when she was 15 years old, subsequently at 20 years old, and most recently several months ago in January 2023.  The typical triggers in the past, at least, used to be not eating enough, not drinking enough, being sleep deprived and tired.    For a few years, she has suffered from lack of appetite and losing weight and was put on mirtazapine at which has helped stabilize those issues.  She now tries to eat frequent meals.  She endorses not being physically active and not undertaking any targeted exercise since high school " (i.e. 5 years).  She had a bad bout of COVID-19 in 2021 and again earlier this year.  Not hospitalized, no ICU.  No history of substance abuse.  Takes spironolactone 25 mg daily for acne (was prescribed by her dermatologist).    Since being in nursing school, she has been busy with schoolwork.  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  Her heart monitor, resting ECG have been normal other than sinus tachycardia.  She has had persistent sinus tachycardia for several months.  As noted above, this is since her thyroid labs have been abnormal with a very suppressed TSH and a high free T4 suggestive of hypothyroidism but more recently her free T4 has normalized.  She has not been on any therapy for it.    There is a family history of vasovagal type fainting but no diagnosed arrhythmias, no ICD.    /84 and orthostatic vitals do not demonstrate orthostatic hypotension, pulse sinus tachycardia of 90 bpm BPM.    I reviewed her labs.  Normal electrolytes, normal renal panel, normal CBC with a hemoglobin of 13.7.  Abnormal thyroid labs with suppressed TSH of 0.02, normal free T4 of 0.8 (previously high at 1.9 suggestive of thyrotoxicosis).    I independently interpreted her Holter monitor from 4/22/2021, ECG dated 4/22/2021, recent Zio patch heart monitor dated 3/17/2023.    ECG shows sinus tachycardia of 105 bpm, normal cardiac intervals.    Holter monitor from 2021 showed sinus rhythm with an average heart rate of 90 bpm no arrhythmias and no patient reported symptoms.    Recent 7-day Holter monitor shows sinus tachycardia with an average heart rate of 105 bpm, no significant ectopy or arrhythmias, patient reported symptoms associated with sinus tachycardia.    On exam - Regular heart sounds, no murmur, no carotid bruit.  Lungs are clear to auscultation.  No lower extremity  edema.    DIAGNOSES/ASSESSMENT:  Vasovagal syndrome with worsening near syncope episodes.  Patient gives a classic history of vasovagal syndrome, I suspect the last few years of not eating optimally, very sedentary lifestyle, and COVID infections have exacerbated the frequency.  Nevertheless, structural heart disease should be ruled out.  Sinus tachycardia.  TSH remains suppressed.  I think that is contributing.  Follow-up with endocrinologist.  Background history of stable depression and anxiety.  On mirtazapine.    PLAN:  Transthoracic echocardiogram with bubble study to be done as soon as possible to rule out structural heart disease that may be causing worsening of her vasovagal syndrome.  My nurse will communicate results to her.  Follow-up with your endocrinologist to assess suppressed TSH/hyperthyroidism which may be contributing to sinus tachycardia.  Follow-up with your dermatologist for an alternative to spironolactone for your acne.  As discussed, spironolactone is a diuretic and will make vasovagal syndrome worse.  Lifestyle modification counseling to help with the near syncope episodes -adequate hydration daily with 80 to 100 ounces of water, strength training 3 times a week, regular aerobic exercise for 30 minutes daily, slight increase in dietary sodium intake, eat regular meals.  I taught her maneuvers to do at the onset of her prodrome so near syncope/syncope could be aborted.  Virtual follow-up with me in 3 months.  She lives and works in [facility] and traveling in winter will be challenging.      Anthony Salmeron MD      New patient.  Today's clinic visit entailed:  Review of prior external note(s) from - [facility] from [facility] reviewed  Review of the result(s) of each unique test - I reviewed results of CBC, renal panel, lipid panel, thyroid panel  The following tests were independently interpreted by me as noted in my documentation: ECG, Holter monitor, Zio patch  "heart monitor.  Ordering of each unique test  Prescription drug management  60 minutes spent by me on the date of the encounter doing chart review, history and exam, documentation and further activities per the note            Vitals: /74 (BP Location: Right arm, Patient Position: Supine, Cuff Size: Adult Regular)   Pulse 90   Ht 1.702 m (5' 7\")   Wt 72.8 kg (160 lb 9.6 oz)   SpO2 98%   BMI 25.15 kg/m    Wt Readings from Last 5 Encounters:   11/09/23 72.8 kg (160 lb 9.6 oz)   09/22/23 71.7 kg (158 lb)   12/20/22 59.7 kg (131 lb 11.2 oz)   11/25/22 59 kg (130 lb)   10/27/22 59.3 kg (130 lb 11.2 oz)         Orders Placed This Encounter   Procedures    Follow-Up with Cardiology    Echocardiogram Complete           CURRENT MEDICATIONS:  Current Outpatient Medications   Medication Sig Dispense Refill    calcium carbonate (OS-JOSÉ) 500 MG tablet       dicyclomine (BENTYL) 20 MG tablet Take 1 tablet (20 mg) by mouth 4 times daily as needed (cramping, diarrhea) 40 tablet 0    JUNEL FE 1/20 1-20 MG-MCG tablet Take 1 tablet by mouth daily      magnesium 250 MG tablet       mirtazapine (REMERON) 15 MG tablet Take 15 mg by mouth      spironolactone (ALDACTONE) 25 MG tablet TAKE 1 TABLET BY MOUTH EVERY DAY 90 tablet 0    triamcinolone (KENALOG) 0.1 % external cream APPLY 1 APPLICATION TOPICALLY DAILY TO LEGS AS NEEDED FOR FLARES 45 g 1         ALLERGIES:  No Known Allergies    PAST MEDICAL HISTORY:    History reviewed. No pertinent past medical history.    PAST SURGICAL HISTORY:    History reviewed. No pertinent surgical history.    FAMILY HISTORY:    Family History   Problem Relation Age of Onset    Pacemaker Paternal Grandfather     Heart Failure Paternal Grandfather        SOCIAL HISTORY:    Social History     Socioeconomic History    Marital status: Single     Spouse name: None    Number of children: None    Years of education: None    Highest education level: None   Tobacco Use    Smoking status: Never     Passive " exposure: Never    Smokeless tobacco: Never   Vaping Use    Vaping Use: Never used   Substance and Sexual Activity    Alcohol use: Never    Drug use: Never     Social Determinants of Health     Financial Resource Strain: Low Risk  (9/22/2023)    Financial Resource Strain     Within the past 12 months, have you or your family members you live with been unable to get utilities (heat, electricity) when it was really needed?: No   Food Insecurity: Low Risk  (9/22/2023)    Food Insecurity     Within the past 12 months, did you worry that your food would run out before you got money to buy more?: No     Within the past 12 months, did the food you bought just not last and you didn t have money to get more?: No   Transportation Needs: Low Risk  (9/22/2023)    Transportation Needs     Within the past 12 months, has lack of transportation kept you from medical appointments, getting your medicines, non-medical meetings or appointments, work, or from getting things that you need?: No   Interpersonal Safety: Low Risk  (9/22/2023)    Interpersonal Safety     Do you feel physically and emotionally safe where you currently live?: Yes     Within the past 12 months, have you been hit, slapped, kicked or otherwise physically hurt by someone?: No     Within the past 12 months, have you been humiliated or emotionally abused in other ways by your partner or ex-partner?: No   Housing Stability: Low Risk  (9/22/2023)    Housing Stability     Do you have housing? : Yes     Are you worried about losing your housing?: No

## 2023-11-10 ENCOUNTER — HOSPITAL ENCOUNTER (OUTPATIENT)
Dept: CARDIOLOGY | Facility: CLINIC | Age: 23
Discharge: HOME | End: 2023-11-10
Attending: INTERNAL MEDICINE | Admitting: INTERNAL MEDICINE
Payer: COMMERCIAL

## 2023-11-10 ENCOUNTER — TELEPHONE (OUTPATIENT)
Dept: CARDIOLOGY | Facility: CLINIC | Age: 23
End: 2023-11-10

## 2023-11-10 DIAGNOSIS — R00.0 TACHYCARDIA: Primary | ICD-10-CM

## 2023-11-10 DIAGNOSIS — R55 VASOVAGAL SYNDROME: ICD-10-CM

## 2023-11-10 DIAGNOSIS — R55 NEAR SYNCOPE: ICD-10-CM

## 2023-11-10 DIAGNOSIS — R00.0 TACHYCARDIA: ICD-10-CM

## 2023-11-10 DIAGNOSIS — R55 SYNCOPE, UNSPECIFIED SYNCOPE TYPE: ICD-10-CM

## 2023-11-10 LAB — LVEF ECHO: NORMAL

## 2023-11-10 PROCEDURE — 999N000208 ECHOCARDIOGRAM COMPLETE

## 2023-11-10 PROCEDURE — 93306 TTE W/DOPPLER COMPLETE: CPT | Mod: 26 | Performed by: INTERNAL MEDICINE

## 2023-11-10 NOTE — TELEPHONE ENCOUNTER
Echo w/bubble study 11/10/2023 noted. Ordered for work up of vasovagal symptoms.    Virtual visit planned for 2/26/2023    Echo:  1. The left ventricle is normal in size. There is normal left ventricular wall thickness. Left ventricular systolic function is normal. The visual ejection fraction is 55-60%. Diastolic Doppler findings (E/E' ratio and/or other parameters) suggest left ventricular filling pressures are normal. No regional wall motion abnormalities noted.  2. The right ventricle is normal size. The right ventricular systolic function is normal.  3. Normal left atrial size. Right atrial size is normal. There is no color Doppler evidence of an atrial shunt. A contrast injection (Bubble Study) was performed that was severely positive for flow across the interatrial septum.  4. Trace to mild mitral and tricuspid regurgitation.  5. No pericardial effusion.  6. No previous study for comparison.      Per Dr. Salmeron's dictation 11/9/2023  Transthoracic echocardiogram with bubble study to be done as soon as possible to rule out structural heart disease that may be causing worsening of her vasovagal syndrome.  My nurse will communicate results to her.  Follow-up with your endocrinologist to assess suppressed TSH/hyperthyroidism which may be contributing to sinus tachycardia.  Follow-up with your dermatologist for an alternative to spironolactone for your acne.  As discussed, spironolactone is a diuretic and will make vasovagal syndrome worse.  Lifestyle modification counseling to help with the near syncope episodes -adequate hydration daily with 80 to 100 ounces of water, strength training 3 times a week, regular aerobic exercise for 30 minutes daily, slight increase in dietary sodium intake, eat regular meals.  I taught her maneuvers to do at the onset of her prodrome so near syncope/syncope could be aborted.  Virtual follow-up with me in 3 months.  She lives and works in [place] [facility] [place] [facility] and traveling in winter  will be challenging.     Will message Dr. Salmeron to review

## 2023-11-13 NOTE — TELEPHONE ENCOUNTER
Reply from Dr. Salmeron:  Anthony Salmeron MD Anderson, Loretta ZAIDI RN  Cc: JOSHUA Fuentes [facility] Team 2  Caller: Unspecified (Today,  2:13 PM)  1.  She probably has a patent foramen ovale.  This is not causing her symptoms.  2.  Please have her complete a cardiac MRI as well.  3.  If she has additional questions, she can set up a video visit with me to discuss.  Earlier than the one that is scheduled.    Thanks.      [id number]  Called patient to review Dr. Salmeron's recommendation for a cMRI. Patient is agreeable to coming to [facility] for this. She states she has no issues with claustrophobia.    Order placed for cMRI.  Message sent to scheduling

## 2023-11-13 NOTE — TELEPHONE ENCOUNTER
Rely from Dr. Salmeron:  Normal echocardiogram.  Thank you.    Dr. Salmeron       Called patient with update of normal echo. She wanted to know what the reader meant by #3: There is no color Doppler evidence of an atrial shunt. A contrast injection (Bubble Study) was performed that was severely positive for flow across the interatrial septum.    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    Will message Dr. Salmeron to review

## 2023-11-14 ENCOUNTER — E-VISIT (OUTPATIENT)
Dept: URGENT CARE | Facility: CLINIC | Age: 23
End: 2023-11-14
Payer: COMMERCIAL

## 2023-11-14 DIAGNOSIS — R21 RASH AND NONSPECIFIC SKIN ERUPTION: Primary | ICD-10-CM

## 2023-11-14 PROCEDURE — 99207 PR NON-BILLABLE SERV PER CHARTING: CPT | Performed by: NURSE PRACTITIONER

## 2023-11-14 NOTE — PATIENT INSTRUCTIONS
Dear Sara Ruiz?     After reviewing your responses, I am unable to make a diagnosis that can be treated online.    You will not be charged for this eVisit.     We are dedicated to helping you achieve your best health and would like to see you in one of our many clinic locations - a primary care provider would be ideal for your concern.    Please use "[employer] to schedule a visit with a provider or call [phone] ([phone]) to schedule at any of our locations.    Thanks for choosing?us?as your health care partner,?   ?   Columba Hernandez, CNP?

## 2023-11-20 ENCOUNTER — PATIENT OUTREACH (OUTPATIENT)
Dept: CARE COORDINATION | Facility: CLINIC | Age: 23
End: 2023-11-20
Payer: COMMERCIAL

## 2023-11-30 ENCOUNTER — E-VISIT (OUTPATIENT)
Dept: FAMILY MEDICINE | Facility: CLINIC | Age: 23
End: 2023-11-30
Payer: COMMERCIAL

## 2023-11-30 DIAGNOSIS — L50.9 HIVES: Primary | ICD-10-CM

## 2023-11-30 PROCEDURE — 99421 OL DIG E/M SVC 5-10 MIN: CPT | Performed by: PHYSICIAN ASSISTANT

## 2023-12-01 RX ORDER — PREDNISONE 20 MG/1
20 TABLET ORAL DAILY
Qty: 5 TABLET | Refills: 0 | Status: SHIPPED | OUTPATIENT
Start: 2023-12-01 | End: 2023-12-06

## 2023-12-04 ENCOUNTER — PATIENT OUTREACH (OUTPATIENT)
Dept: CARE COORDINATION | Facility: CLINIC | Age: 23
End: 2023-12-04
Payer: COMMERCIAL

## 2023-12-15 ENCOUNTER — HOSPITAL ENCOUNTER (OUTPATIENT)
Dept: CARDIOLOGY | Facility: CLINIC | Age: 23
Discharge: HOME | End: 2023-12-15
Attending: INTERNAL MEDICINE | Admitting: INTERNAL MEDICINE
Payer: COMMERCIAL

## 2023-12-15 ENCOUNTER — TELEPHONE (OUTPATIENT)
Dept: CARDIOLOGY | Facility: CLINIC | Age: 23
End: 2023-12-15

## 2023-12-15 DIAGNOSIS — R00.0 TACHYCARDIA: ICD-10-CM

## 2023-12-15 DIAGNOSIS — R55 SYNCOPE, UNSPECIFIED SYNCOPE TYPE: ICD-10-CM

## 2023-12-15 PROCEDURE — 75565 CARD MRI VELOC FLOW MAPPING: CPT | Mod: 26 | Performed by: INTERNAL MEDICINE

## 2023-12-15 PROCEDURE — 75565 CARD MRI VELOC FLOW MAPPING: CPT

## 2023-12-15 PROCEDURE — 75561 CARDIAC MRI FOR MORPH W/DYE: CPT | Mod: 26 | Performed by: INTERNAL MEDICINE

## 2023-12-15 PROCEDURE — A9585 GADOBUTROL INJECTION: HCPCS | Performed by: INTERNAL MEDICINE

## 2023-12-15 PROCEDURE — 255N000002 HC RX 255 OP 636: Performed by: INTERNAL MEDICINE

## 2023-12-15 RX ORDER — DIPHENHYDRAMINE HYDROCHLORIDE 50 MG/ML
25-50 INJECTION INTRAMUSCULAR; INTRAVENOUS
Status: DISCONTINUED | OUTPATIENT
Start: 2023-12-15 | End: 2023-12-16 | Stop reason: HOSPADM

## 2023-12-15 RX ORDER — GADOBUTROL 604.72 MG/ML
10 INJECTION INTRAVENOUS ONCE
Status: COMPLETED | OUTPATIENT
Start: 2023-12-15 | End: 2023-12-15

## 2023-12-15 RX ORDER — ONDANSETRON 2 MG/ML
4 INJECTION INTRAMUSCULAR; INTRAVENOUS
Status: DISCONTINUED | OUTPATIENT
Start: 2023-12-15 | End: 2023-12-16 | Stop reason: HOSPADM

## 2023-12-15 RX ORDER — DIAZEPAM 5 MG
5 TABLET ORAL EVERY 30 MIN PRN
Status: DISCONTINUED | OUTPATIENT
Start: 2023-12-15 | End: 2023-12-16 | Stop reason: HOSPADM

## 2023-12-15 RX ORDER — ALBUTEROL SULFATE 90 UG/1
2 AEROSOL, METERED RESPIRATORY (INHALATION) EVERY 5 MIN PRN
Status: DISCONTINUED | OUTPATIENT
Start: 2023-12-15 | End: 2023-12-16 | Stop reason: HOSPADM

## 2023-12-15 RX ORDER — REGADENOSON 0.08 MG/ML
0.4 INJECTION, SOLUTION INTRAVENOUS ONCE
Status: DISCONTINUED | OUTPATIENT
Start: 2023-12-15 | End: 2023-12-16 | Stop reason: HOSPADM

## 2023-12-15 RX ORDER — DIPHENHYDRAMINE HCL 25 MG
25 CAPSULE ORAL
Status: DISCONTINUED | OUTPATIENT
Start: 2023-12-15 | End: 2023-12-16 | Stop reason: HOSPADM

## 2023-12-15 RX ORDER — ACYCLOVIR 200 MG/1
0-1 CAPSULE ORAL
Status: DISCONTINUED | OUTPATIENT
Start: 2023-12-15 | End: 2023-12-16 | Stop reason: HOSPADM

## 2023-12-15 RX ORDER — AMINOPHYLLINE 25 MG/ML
100 INJECTION, SOLUTION INTRAVENOUS ONCE
Status: DISCONTINUED | OUTPATIENT
Start: 2023-12-15 | End: 2023-12-16 | Stop reason: HOSPADM

## 2023-12-15 RX ORDER — CAFFEINE CITRATE 20 MG/ML
60 SOLUTION INTRAVENOUS
Status: DISCONTINUED | OUTPATIENT
Start: 2023-12-15 | End: 2023-12-16 | Stop reason: HOSPADM

## 2023-12-15 RX ORDER — METHYLPREDNISOLONE SODIUM SUCCINATE 125 MG/2ML
125 INJECTION, POWDER, LYOPHILIZED, FOR SOLUTION INTRAMUSCULAR; INTRAVENOUS
Status: DISCONTINUED | OUTPATIENT
Start: 2023-12-15 | End: 2023-12-16 | Stop reason: HOSPADM

## 2023-12-15 RX ADMIN — GADOBUTROL 10 ML: 604.72 INJECTION INTRAVENOUS at 15:05

## 2023-12-15 NOTE — TELEPHONE ENCOUNTER
Reply from Dr. Salmeron:  Anthony Salmeron MD Theis, Marcie J RN  Cc: JOSHUA Fuentes [facility] Heart Team 2  Caller: Unspecified (Today,  3:52 PM)  Consistent with PFO. Nothing to do.    SJ    1625 spoke with patient to review results. 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 She is pleased with the outcome as most of the views are WNL.

## 2023-12-15 NOTE — TELEPHONE ENCOUNTER
MR Cardiac 12-15-23   1. Normal LV and RV systolic function with LVEF of 61%.  2. Possible PFO noted. Qp/Qs 1.  3. Late gadolinium enhancement imaging shows no MI, fibrosis or infiltrative disease     Next Dr. Jaron ORTEGA 2-26-24.     Last telephone note 11-13-23 message reply from Dr. Salmeron -   1.  She probably has a patent foramen ovale.  This is not causing her symptoms.  2.  Please have her complete a cardiac MRI as well.  3.  If she has additional questions, she can set up a video visit with me to discuss.  Earlier than the one that is scheduled.     Last Dr. Jaron ORTEGA 11-9-23   PLAN:  Transthoracic echocardiogram with bubble study to be done as soon as possible to rule out structural heart disease that may be causing worsening of her vasovagal syndrome.  My nurse will communicate results to her.  Follow-up with your endocrinologist to assess suppressed TSH/hyperthyroidism which may be contributing to sinus tachycardia.  Follow-up with your dermatologist for an alternative to spironolactone for your acne.  As discussed, spironolactone is a diuretic and will make vasovagal syndrome worse.  Lifestyle modification counseling to help with the near syncope episodes -adequate hydration daily with 80 to 100 ounces of water, strength training 3 times a week, regular aerobic exercise for 30 minutes daily, slight increase in dietary sodium intake, eat regular meals.  I taught her maneuvers to do at the onset of her prodrome so near syncope/syncope could be aborted.  Virtual follow-up with me in 3 months.  She lives and works in [facility] and traveling in winter will be challenging.

## 2023-12-23 DIAGNOSIS — L70.8 OTHER ACNE: ICD-10-CM

## 2023-12-26 RX ORDER — SPIRONOLACTONE 25 MG/1
25 TABLET ORAL DAILY
Qty: 30 TABLET | Refills: 2 | Status: SHIPPED | OUTPATIENT
Start: 2023-12-26

## 2024-01-28 ENCOUNTER — HEALTH MAINTENANCE LETTER (OUTPATIENT)
Age: 24
End: 2024-01-28

## 2024-02-20 NOTE — PROGRESS NOTES
Endocrinology and Diabetes Clinic Visit     Reason for Consult: Low TSH  Consulting Provider: Soledad Tran   PCP: Bhupinder Simmons    Subjective:   Sara Ruiz was seen today for follow up of thyroiditis.     HPI (from initial visit):  She presented to the ED on 9/15/23 with near syncopal episodes and tachycardia, and was found to have low TSH <0.01 and mild elevation in FT4 at 1.6 (range 0.7-1.5). EKG showed sinus tachycardia with . On repeat labs on 9/22/23 with her PCP FT4 was slightly higher (on a different assay) at 1.94 (range 0.9-1.7).     She has history of syncopal/near syncopal episodes which have been happening more frequently since January 2023. She has lost consciousness 3 times in her life, once in highschool, once in nursing school, and in January. During the January episode she was sitting in a lecture at school, and noticed her usual pre-syncopal symptoms of nausea, tunnel vision, loss of hearing, and then she passed out. This lasted a few seconds before she regained consciousness. She was wearing her watch and pulse was slow during the episode.     She has always had a high resting heart rate (typically around 100) and exercise intolerance. This has not really changed recently.     She has no prior history of thyroid disease. There is also no family history of thyroid disease.     Weight is up 15 lbs in the past several months, this corresponds to stopping her stimulant for ADHD in May when school ended (baseline 135 lbs, now 155 lbs). She endorses temperature intolerance, she tends to feel cold (this is not new but may be worse recenltly). No tremor. No change in stools.     She was ill with COVID 1.5 months ago, tested positive 8/21/2023. She had URI symptoms.     Menses started at age 15 and have always been irregular. She takes an OCP for this. No recent changes. She has never been pregnant.     Supplements - prenatal MVI only, no other supplements   Biotin - no   Iodine  exposure - no   Contrast exposure - no  Neck pain/swelling - no   Eye symptoms - she has chronic dry eye, not worse recently     Interval History:   TSI was negative and TPO antibody positive. Without treatment, thyroid hormone levels were high, then borderline low (with high TSH) and now within expected ranges, consistent with a diagnosis of thyroiditis.     She didn't have a lot of symptoms to begin with, so it's hard to say if symptoms have been changing. She endorses fatigue (is also currently working the night shift) and increased resting heart rate which has not changed.     She saw a cardiologist and was diagnosed with an asymptomatic small PFO but was told it's not an issue, and there were no other problems found on workup.     Stopped stimulant in May 2023. No change in HR when she stopped, no change with thyroid hormone fluctuations.     Weight - she gained when stopped stimulant, now leveled out. She is happy with current weight. She does notice that appetite is less lately.     Medications:   Current Outpatient Medications   Medication Sig Dispense Refill    dicyclomine (BENTYL) 20 MG tablet Take 1 tablet (20 mg) by mouth 4 times daily as needed (cramping, diarrhea) 40 tablet 0    JUNEL FE 1/20 1-20 MG-MCG tablet Take 1 tablet by mouth daily      magnesium 250 MG tablet       mirtazapine (REMERON) 15 MG tablet Take 15 mg by mouth      spironolactone (ALDACTONE) 25 MG tablet TAKE 1 TABLET BY MOUTH EVERY DAY 30 tablet 2    triamcinolone (KENALOG) 0.1 % external cream APPLY 1 APPLICATION TOPICALLY DAILY TO LEGS AS NEEDED FOR FLARES 45 g 1       Past Medical History:  No past medical history on file.    No past surgical history on file.    Patient Active Problem List   Diagnosis    Anxiety    Attention deficit disorder of adult    Moderate major depression (H)    Tachycardia    Diarrhea, unspecified type     Acne, for which she takes spironolactone  Irregular menses, now taking an OCP  Anxiety   ADHD  "    Family History:  There is no family history of thyroid disease.   Her father has type 2 diabetes.     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    Weight as of this encounter: 71.2 kg (157 lb).    BP Readings from Last 3 Encounters:   11/09/23 113/74   09/22/23 100/40   03/11/23 107/68     GENERAL: Healthy, alert and no distress  EYES: Eyes grossly normal to inspection.  No discharge or erythema, or obvious scleral/conjunctival abnormalities.  NECK: No asymmetry, visible masses or scars. There is no visible goiter.   RESP: No audible wheeze, cough, or visible cyanosis.  No increased work of breathing.    SKIN: Visible skin clear. No significant rash, abnormal pigmentation or lesions.  NEURO: Cranial nerves grossly intact.  Mentation and speech appropriate.  PSYCH: Affect normal/bright, judgement and insight intact, normal speech and appearance well-groomed.    Labs and Studies:      ENDO THYROID LABS-P      TSH TSH FREE T4   Latest Ref Rng 0.40 - 4.00 mU/L 0.30 - 4.20 uIU/mL 0.90 - 1.70 ng/dL   7/15/2020  1:43 PM 1.14      10/27/2022  9:00 AM 2.08      9/22/2023  1:23 PM  <0.01 (L)  1.94 (H)          9/15/2023 Labs ([place])   Free T4 1.6 (0.7-1.5)   TSH < 0.01    10/10/2023:   TSH 0.02  FT4 0.8  FT3 2.5  TSI <1.0    11/14/2023:   TSH 6.31  FT4 0.7  TT3 150    12/12/2023:   TSH 3.54  FT4 0.9  TT3 162    1/15/2024:  TSH 2.13  FT4 0.9  TT3 128      Assessment:  1. Thyroiditis, resolved. She did have " COVID-19 infection several weeks prior to diagnosis, and it is possible this was related. Thyroid lab abnormalities were mild, and hormone levels have now returned to expected ranges. TSH last month was 2.13.   2. Positive TPO antibody.   3. Baseline tachycardia.  4. Syncope/near syncopal episodes of unknown etiology. She saw cardiology for workup, nothing significant found.   5. Anxiety/depression. She is already following with a therapist.   6. Irregular menses and acne, managed with spironolactone and OCP.     Plan:   With history of thyroiditis and positive TPO antibody, she should continue to have TSH monitored at least annually. This can be arranged with her PCP. If she develops recurrent symptoms this should be checked sooner. We also briefly discussed the possibility of future pregnancy, and that TSH monitoring before and during any pregnancy is advised.     No treatment is currently needed.     She should follow up in Endocrine Clinic on an as needed basis.       Video-Visit Details    Type of Service:  Video Visit    Video Start Time: 10:04 AM    Video End Time: 10:23 AM    Platform Used for Video Visit: [employer]     Provider Location: On-site      30 minutes spent on the date of the encounter doing chart review, history and exam, documentation and further activities per the note.       Elizabeth Washington MD  Endocrinology and Diabetes   [phone]

## 2024-02-22 ENCOUNTER — VIRTUAL VISIT (OUTPATIENT)
Dept: ENDOCRINOLOGY | Facility: CLINIC | Age: 24
End: 2024-02-22
Payer: COMMERCIAL

## 2024-02-22 VITALS — BODY MASS INDEX: 24.64 KG/M2 | WEIGHT: 157 LBS | HEIGHT: 67 IN

## 2024-02-22 DIAGNOSIS — E06.9 THYROIDITIS: Primary | ICD-10-CM

## 2024-02-22 DIAGNOSIS — R76.8 ANTI-TPO ANTIBODIES PRESENT: ICD-10-CM

## 2024-02-22 PROCEDURE — 99214 OFFICE O/P EST MOD 30 MIN: CPT | Mod: 95 | Performed by: INTERNAL MEDICINE

## 2024-02-22 ASSESSMENT — PAIN SCALES - GENERAL: PAINLEVEL: NO PAIN (0)

## 2024-02-22 ASSESSMENT — PATIENT HEALTH QUESTIONNAIRE - PHQ9: SUM OF ALL RESPONSES TO PHQ QUESTIONS 1-9: 17

## 2024-02-22 NOTE — LETTER
2/22/2024       RE: Sara Ruiz  [address]  [facility] [address]     Dear Colleague,    Thank you for referring your patient, Sara Ruiz, to the [facility] ENDOCRIN[facility] [place] [facility]. Please see a copy of my visit note below.    Endocrinology and Diabetes Clinic Visit     Reason for Consult: Low TSH  Consulting Provider: Soledad Tran   PCP: Bhupinder Simmons    Subjective:   Sara Ruiz was seen today for follow up of thyroiditis.     HPI (from initial visit):  She presented to the ED on 9/15/23 with near syncopal episodes and tachycardia, and was found to have low TSH <0.01 and mild elevation in FT4 at 1.6 (range 0.7-1.5). EKG showed sinus tachycardia with . On repeat labs on 9/22/23 with her PCP FT4 was slightly higher (on a different assay) at 1.94 (range 0.9-1.7).     She has history of syncopal/near syncopal episodes which have been happening more frequently since January 2023. She has lost consciousness 3 times in her life, once in highschool, once in nursing school, and in January. During the January episode she was sitting in a lecture at school, and noticed her usual pre-syncopal symptoms of nausea, tunnel vision, loss of hearing, and then she passed out. This lasted a few seconds before she regained consciousness. She was wearing her watch and pulse was slow during the episode.     She has always had a high resting heart rate (typically around 100) and exercise intolerance. This has not really changed recently.     She has no prior history of thyroid disease. There is also no family history of thyroid disease.     Weight is up 15 lbs in the past several months, this corresponds to stopping her stimulant for ADHD in May when school ended (baseline 135 lbs, now 155 lbs). She endorses temperature intolerance, she tends to feel cold (this is not new but may be worse recenltly). No tremor. No change in  stools.     She was ill with COVID 1.5 months ago, tested positive 8/21/2023. She had URI symptoms.     Menses started at age 15 and have always been irregular. She takes an OCP for this. No recent changes. She has never been pregnant.     Supplements - prenatal MVI only, no other supplements   Biotin - no   Iodine exposure - no   Contrast exposure - no  Neck pain/swelling - no   Eye symptoms - she has chronic dry eye, not worse recently     Interval History:   TSI was negative and TPO antibody positive. Without treatment, thyroid hormone levels were high, then borderline low (with high TSH) and now within expected ranges, consistent with a diagnosis of thyroiditis.     She didn't have a lot of symptoms to begin with, so it's hard to say if symptoms have been changing. She endorses fatigue (is also currently working the night shift) and increased resting heart rate which has not changed.     She saw a cardiologist and was diagnosed with an asymptomatic small PFO but was told it's not an issue, and there were no other problems found on workup.     Stopped stimulant in May 2023. No change in HR when she stopped, no change with thyroid hormone fluctuations.     Weight - she gained when stopped stimulant, now leveled out. She is happy with current weight. She does notice that appetite is less lately.     Medications:   Current Outpatient Medications   Medication Sig Dispense Refill    dicyclomine (BENTYL) 20 MG tablet Take 1 tablet (20 mg) by mouth 4 times daily as needed (cramping, diarrhea) 40 tablet 0    JUNEL FE 1/20 1-20 MG-MCG tablet Take 1 tablet by mouth daily      magnesium 250 MG tablet       mirtazapine (REMERON) 15 MG tablet Take 15 mg by mouth      spironolactone (ALDACTONE) 25 MG tablet TAKE 1 TABLET BY MOUTH EVERY DAY 30 tablet 2    triamcinolone (KENALOG) 0.1 % external cream APPLY 1 APPLICATION TOPICALLY DAILY TO LEGS AS NEEDED FOR FLARES 45 g 1       Past Medical History:  No past medical history on  "file.    No past surgical history on file.    Patient Active Problem List   Diagnosis    Anxiety    Attention deficit disorder of adult    Moderate major depression (H)    Tachycardia    Diarrhea, unspecified type     Acne, for which she takes spironolactone  Irregular menses, now taking an OCP  Anxiety   ADHD     Family History:  There is no family history of thyroid disease.   Her father has type 2 diabetes.     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    Weight as of this encounter: 71.2 kg (157 lb).    BP Readings from Last 3 Encounters:   11/09/23 113/74   09/22/23 100/40   03/11/23 107/68     GENERAL: Healthy, alert and no distress  EYES: Eyes grossly normal to inspection.  No discharge or erythema, or obvious scleral/conjunctival abnormalities.  NECK: No asymmetry, visible masses or scars. There is no visible goiter.   RESP: No audible wheeze, cough, or visible cyanosis.  No increased work of breathing.    SKIN: Visible skin clear. No significant rash, abnormal pigmentation or lesions.  NEURO: Cranial nerves grossly intact.  Mentation and speech appropriate.  PSYCH: Affect normal/bright, judgement and insight intact, normal speech and appearance well-groomed.    Labs and Studies:      ENDO THYROID LABS-[facility]      TSH TSH FREE T4   Latest Ref Rng 0.40 - 4.00 mU/L 0.30 - 4.20 uIU/mL 0.90 - 1.70 ng/dL   7/15/2020  1:43 PM 1.14      10/27/2022  9:00 AM 2.08      9/22/2023  1:23 PM  <0.01 (L)  1.94 (H)  "         9/15/2023 Labs ([place])   Free T4 1.6 (0.7-1.5)   TSH < 0.01    10/10/2023:   TSH 0.02  FT4 0.8  FT3 2.5  TSI <1.0    11/14/2023:   TSH 6.31  FT4 0.7  TT3 150    12/12/2023:   TSH 3.54  FT4 0.9  TT3 162    1/15/2024:  TSH 2.13  FT4 0.9  TT3 128      Assessment:  1. Thyroiditis, resolved. She did have COVID-19 infection several weeks prior to diagnosis, and it is possible this was related. Thyroid lab abnormalities were mild, and hormone levels have now returned to expected ranges. TSH last month was 2.13.   2. Positive TPO antibody.   3. Baseline tachycardia.  4. Syncope/near syncopal episodes of unknown etiology. She saw cardiology for workup, nothing significant found.   5. Anxiety/depression. She is already following with a therapist.   6. Irregular menses and acne, managed with spironolactone and OCP.     Plan:   With history of thyroiditis and positive TPO antibody, she should continue to have TSH monitored at least annually. This can be arranged with her PCP. If she develops recurrent symptoms this should be checked sooner. We also briefly discussed the possibility of future pregnancy, and that TSH monitoring before and during any pregnancy is advised.     No treatment is currently needed.     She should follow up in Endocrine Clinic on an as needed basis.       Video-Visit Details    Type of Service:  Video Visit    Video Start Time: 10:04 AM    Video End Time: 10:23 AM    Platform Used for Video Visit: [employer]     Provider Location: On-site      30 minutes spent on the date of the encounter doing chart review, history and exam, documentation and further activities per the note.       Elizabeth Washington MD  Endocrinology and Diabetes   [phone]

## 2024-02-22 NOTE — NURSING NOTE
Is the patient currently in the state of [place]? YES    Visit mode:VIDEO    If the visit is dropped, the patient can be reconnected by: VIDEO VISIT: Text to cell phone:   Telephone Information:   Mobile [phone]       Will anyone else be joining the visit? NO  (If patient encounters technical issues they should call [phone] :[id number])    How would you like to obtain your AVS? MyChart    Are changes needed to the allergy or medication list? No  Reason for visit: RECHECK    Bobbilynn Grossaint VVF    Depression Response    Patient completed the PHQ-9 assessment for depression and scored >9? Yes  Question 9 on the PHQ-9 was positive for suicidality? No  Does patient have current mental health provider? Yes    Is this a virtual visit? Yes   Does patient have suicidal ideation (positive question 9)? No - offer to place Mental Health Referral.  Patient declined referral/not needed    I personally notified the following: visit provider

## 2024-02-26 ENCOUNTER — VIRTUAL VISIT (OUTPATIENT)
Dept: CARDIOLOGY | Facility: CLINIC | Age: 24
End: 2024-02-26
Attending: INTERNAL MEDICINE
Payer: COMMERCIAL

## 2024-02-26 DIAGNOSIS — L70.8 OTHER ACNE: ICD-10-CM

## 2024-02-26 DIAGNOSIS — R00.0 TACHYCARDIA: ICD-10-CM

## 2024-02-26 DIAGNOSIS — R55 NEAR SYNCOPE: ICD-10-CM

## 2024-02-26 DIAGNOSIS — R55 VASOVAGAL SYNDROME: ICD-10-CM

## 2024-02-26 PROCEDURE — 99213 OFFICE O/P EST LOW 20 MIN: CPT | Mod: 95 | Performed by: INTERNAL MEDICINE

## 2024-02-26 RX ORDER — SPIRONOLACTONE 25 MG/1
25 TABLET ORAL DAILY
Qty: 100 TABLET | Refills: 5 | Status: CANCELLED | OUTPATIENT
Start: 2024-02-26

## 2024-02-26 NOTE — LETTER
"2/26/2024    Bhupinder Simmons PA-C  [address] [facility] [address]    RE: Sara Ruiz       Dear Colleague,     I had the pleasure of seeing Sara Ruiz in the [facility].  Chyna is a 23 year old who is being evaluated via a billable video visit.      How would you like to obtain your AVS? MyChart  If the video visit is dropped, the invitation should be resent by: Send to e-mail at: [email or web address]  Will anyone else be joining your video visit? No    Review Of Systems  Skin: NEGATIVE  Eyes:Ears/Nose/Throat: NEGATIVE  Respiratory: NEGATIVE  Cardiovascular:NEGATIVE  Gastrointestinal: NEGATIVE  Genitourinary:NEGATIVE   Musculoskeletal: NEGATIVE  Neurologic: NEGATIVE  Psychiatric: NEGATIVE  Hematologic/Lymphatic/Immunologic: NEGATIVE  Endocrine:  NEGATIVE  Vitals - Patient Reported  Systolic (Patient Reported):  (n/a)  Diastolic (Patient Reported):  (n/a)  Weight (Patient Reported): 71.2 kg (157 lb)  Height (Patient Reported): 170.2 cm (5' 7\")  Pulse (Patient Reported):  (n/a)       Telephone number of patient:  [phone]    Sarita Gamble LPN      Video-Visit Details    Type of service:  Video Visit     Originating Location (pt. Location): Home    Distant Location (provider location):  On-site  Platform used for Video Visit: [id number]       SERVICE DATE: February 27, 2024    VIDEO VISIT.    PRIMARY CARE PROVIDER:  Bhupinder Simmons  [address] [address] [facility] [address]    REASON FOR VISIT:  Follow-up of vasovagal syndrome and tachycardia.    HISTORY OF PRESENT ILLNESS:  Sara Ruiz is 23 year old female, whom I had seen in November to the send 23 for vasovagal syndrome with near syncope episodes and sinus tachycardia.  She is an RN by profession, works at [employer] in [place], [place].    Her history is significant for chronic depression and anxiety, poor appetite for which she has been on mirtazapine for over 10 years, on spironolactone for acne " disorder, never tobacco user, BMI 25, history of hyperthyroidism in the context of recent COVID thyroiditis that has resolved.    At her last visit with me, all her testing including renal panel, CBC, ECG, Holter monitor, Zio patch heart monitor was normal.    I had advised her to exercise more, avoid prolonged standing, take up some strength training, increase water intake, eat regular meals.  She has done these.  Symptoms have improved.  She has not had any more near syncopal episodes.  In the last 4 months, there have been only a couple of episodes where she felt briefly dizzy and she has identified that this happens when she does not eat breakfast.    I reviewed her recent transthoracic echocardiogram and concur with report.  Normal LVEF of 55-60%, normal right ventricular systolic function, normal atrial size, contrast injection positive for flow across the atrial septum consistent with a patent foramen ovale, no significant valve disease.    DIAGNOSES/ASSESSMENT:  Vasovagal syndrome and sinus tachycardia.  Improved.  Patent foramina will be able with normal atrial size and positive bubble study.  Reassured patient that this is not contributory to her symptom complex.  This is a normal anatomic variant that is present in up to 30% of the population.  Her atrial size and ventricular function are normal.  Findings not consistent with an atrial septal defect.    PLAN:  All tests reviewed in detail with patient.  Reiterated the importance of regular exercise program including strength training, adequate water intake and regular meals to avoid the symptoms.  Also counseled her that typically over time, vasovagal vagal syndrome becomes less symptomatic.  Follow-up with cardiology, as needed.      Anthony Salmeron MD      Established patient.   The level of medical decision making during this visit was of moderate complexity.        Vitals: There were no vitals taken for this visit.  Wt Readings from Last 5  Encounters:   02/22/24 71.2 kg (157 lb)   11/09/23 72.8 kg (160 lb 9.6 oz)   09/22/23 71.7 kg (158 lb)   12/20/22 59.7 kg (131 lb 11.2 oz)   11/25/22 59 kg (130 lb)             CURRENT MEDICATIONS:  Current Outpatient Medications   Medication Sig Dispense Refill    dicyclomine (BENTYL) 20 MG tablet Take 1 tablet (20 mg) by mouth 4 times daily as needed (cramping, diarrhea) 40 tablet 0    JUNEL FE 1/20 1-20 MG-MCG tablet Take 1 tablet by mouth daily      magnesium 250 MG tablet       mirtazapine (REMERON) 15 MG tablet Take 15 mg by mouth      spironolactone (ALDACTONE) 25 MG tablet TAKE 1 TABLET BY MOUTH EVERY DAY 30 tablet 2    triamcinolone (KENALOG) 0.1 % external cream APPLY 1 APPLICATION TOPICALLY DAILY TO LEGS AS NEEDED FOR FLARES 45 g 1         ALLERGIES:  No Known Allergies    PAST MEDICAL HISTORY:    History reviewed. No pertinent past medical history.    PAST SURGICAL HISTORY:    History reviewed. No pertinent surgical history.    FAMILY HISTORY:    Family History   Problem Relation Age of Onset    Pacemaker Paternal Grandfather     Heart Failure Paternal Grandfather        Thank you for allowing me to participate in the care of your patient.      Sincerely,     Anthony Salmeron MD     [facility]  cc:   Anthony Salmeron MD  [address] [place] [id number]

## 2024-02-26 NOTE — PROGRESS NOTES
"Chyna is a 23 year old who is being evaluated via a billable video visit.      How would you like to obtain your AVS? MyChart  If the video visit is dropped, the invitation should be resent by: Send to e-mail at: [email or web address]  Will anyone else be joining your video visit? No    Review Of Systems  Skin: NEGATIVE  Eyes:Ears/Nose/Throat: NEGATIVE  Respiratory: NEGATIVE  Cardiovascular:NEGATIVE  Gastrointestinal: NEGATIVE  Genitourinary:NEGATIVE   Musculoskeletal: NEGATIVE  Neurologic: NEGATIVE  Psychiatric: NEGATIVE  Hematologic/Lymphatic/Immunologic: NEGATIVE  Endocrine:  NEGATIVE  Vitals - Patient Reported  Systolic (Patient Reported):  (n/a)  Diastolic (Patient Reported):  (n/a)  Weight (Patient Reported): 71.2 kg (157 lb)  Height (Patient Reported): 170.2 cm (5' 7\")  Pulse (Patient Reported):  (n/a)       Telephone number of patient:  [phone]    Sarita Gamble LPN      Video-Visit Details    Type of service:  Video Visit     Originating Location (pt. Location): Home    Distant Location (provider location):  On-site  Platform used for Video Visit: [employer]   "

## 2024-02-27 NOTE — PROGRESS NOTES
SERVICE DATE: February 27, 2024    VIDEO VISIT.    PRIMARY CARE PROVIDER:  Bhupinder Simmons  [id number] [facility] [id number]    REASON FOR VISIT:  Follow-up of vasovagal syndrome and tachycardia.    HISTORY OF PRESENT ILLNESS:  Sara Ruiz is 23 year old female, whom I had seen in November to the send 23 for vasovagal syndrome with near syncope episodes and sinus tachycardia.  She is an RN by profession, works at [employer] in [place], [place].    Her history is significant for chronic depression and anxiety, poor appetite for which she has been on mirtazapine for over 10 years, on spironolactone for acne disorder, never tobacco user, BMI 25, history of hyperthyroidism in the context of recent COVID thyroiditis that has resolved.    At her last visit with me, all her testing including renal panel, CBC, ECG, Holter monitor, Zio patch heart monitor was normal.    I had advised her to exercise more, avoid prolonged standing, take up some strength training, increase water intake, eat regular meals.  She has done these.  Symptoms have improved.  She has not had any more near syncopal episodes.  In the last 4 months, there have been only a couple of episodes where she felt briefly dizzy and she has identified that this happens when she does not eat breakfast.    I reviewed her recent transthoracic echocardiogram and concur with report.  Normal LVEF of 55-60%, normal right ventricular systolic function, normal atrial size, contrast injection positive for flow across the atrial septum consistent with a patent foramen ovale, no significant valve disease.    DIAGNOSES/ASSESSMENT:  Vasovagal syndrome and sinus tachycardia.  Improved.  Patent foramina will be able with normal atrial size and positive bubble study.  Reassured patient that this is not contributory to her symptom complex.  This is a normal anatomic variant that is present in up to 30% of the population.  Her atrial size and ventricular  function are normal.  Findings not consistent with an atrial septal defect.    PLAN:  All tests reviewed in detail with patient.  Reiterated the importance of regular exercise program including strength training, adequate water intake and regular meals to avoid the symptoms.  Also counseled her that typically over time, vasovagal vagal syndrome becomes less symptomatic.  Follow-up with cardiology, as needed.      Anthony Salmeron MD      Established patient.   The level of medical decision making during this visit was of moderate complexity.        Vitals: There were no vitals taken for this visit.  Wt Readings from Last 5 Encounters:   02/22/24 71.2 kg (157 lb)   11/09/23 72.8 kg (160 lb 9.6 oz)   09/22/23 71.7 kg (158 lb)   12/20/22 59.7 kg (131 lb 11.2 oz)   11/25/22 59 kg (130 lb)             CURRENT MEDICATIONS:  Current Outpatient Medications   Medication Sig Dispense Refill    dicyclomine (BENTYL) 20 MG tablet Take 1 tablet (20 mg) by mouth 4 times daily as needed (cramping, diarrhea) 40 tablet 0    JUNEL FE 1/20 1-20 MG-MCG tablet Take 1 tablet by mouth daily      magnesium 250 MG tablet       mirtazapine (REMERON) 15 MG tablet Take 15 mg by mouth      spironolactone (ALDACTONE) 25 MG tablet TAKE 1 TABLET BY MOUTH EVERY DAY 30 tablet 2    triamcinolone (KENALOG) 0.1 % external cream APPLY 1 APPLICATION TOPICALLY DAILY TO LEGS AS NEEDED FOR FLARES 45 g 1         ALLERGIES:  No Known Allergies    PAST MEDICAL HISTORY:    History reviewed. No pertinent past medical history.    PAST SURGICAL HISTORY:    History reviewed. No pertinent surgical history.    FAMILY HISTORY:    Family History   Problem Relation Age of Onset    Pacemaker Paternal Grandfather     Heart Failure Paternal Grandfather

## 2024-03-22 DIAGNOSIS — L70.8 OTHER ACNE: ICD-10-CM

## 2024-03-22 NOTE — LETTER
March 25, 2024      Sara Ruiz  [address]  [place] [address]        Diego Clemente,     We have reached out to you by phone and  left a voicemail.     It looks like you are due to see your provider for a Medication follow up.     Please call the clinic back at: [phone], so that we can assist you with scheduling.     Thank You.     [facility] - April Chen

## 2024-03-25 RX ORDER — SPIRONOLACTONE 25 MG/1
25 TABLET ORAL DAILY
Qty: 30 TABLET | Refills: 2 | OUTPATIENT
Start: 2024-03-25

## 2024-03-25 NOTE — TELEPHONE ENCOUNTER
Haven't seen her in person for a couple years.  I still don't think I started this med for her, looks like I have refilled it though?  She will need an appointment and labs for more refills.    Bhupinder

## 2024-03-25 NOTE — TELEPHONE ENCOUNTER
LVMTCB, MCM and letter was sent to the Pt about scheduling a follow up visit before a refill has been placed by provider.     Will F/U with in a few days to assist.     Sara Chen

## 2024-03-27 NOTE — TELEPHONE ENCOUNTER
LVMTCB to help with scheduling a OV with provider for a medication check.     MCM and letter was sent already.     Sara Chen

## 2025-02-01 ENCOUNTER — HEALTH MAINTENANCE LETTER (OUTPATIENT)
Age: 25
End: 2025-02-01